# Patient Record
Sex: FEMALE | Race: WHITE | NOT HISPANIC OR LATINO | Employment: FULL TIME | URBAN - NONMETROPOLITAN AREA
[De-identification: names, ages, dates, MRNs, and addresses within clinical notes are randomized per-mention and may not be internally consistent; named-entity substitution may affect disease eponyms.]

---

## 2020-08-06 DIAGNOSIS — N95.0 ABNORMAL VAGINAL BLEEDING IN POSTMENOPAUSAL PATIENT: ICD-10-CM

## 2020-08-06 DIAGNOSIS — Z03.818 ENCOUNTER FOR OBSERVATION FOR SUSPECTED EXPOSURE TO OTHER BIOLOGICAL AGENTS RULED OUT: ICD-10-CM

## 2020-08-06 DIAGNOSIS — N95.0 POSTMENOPAUSAL BLEEDING: ICD-10-CM

## 2020-08-06 DIAGNOSIS — Z01.818 PREOP TESTING: Primary | ICD-10-CM

## 2020-08-06 RX ORDER — SODIUM CHLORIDE, SODIUM LACTATE, POTASSIUM CHLORIDE, CALCIUM CHLORIDE 600; 310; 30; 20 MG/100ML; MG/100ML; MG/100ML; MG/100ML
INJECTION, SOLUTION INTRAVENOUS CONTINUOUS
Status: CANCELLED | OUTPATIENT
Start: 2020-08-06

## 2020-08-06 RX ORDER — DOXYCYCLINE HYCLATE 100 MG
100 TABLET ORAL 2 TIMES DAILY
Status: CANCELLED | OUTPATIENT
Start: 2020-08-06 | End: 2020-08-07

## 2020-08-07 ENCOUNTER — ANESTHESIA EVENT (OUTPATIENT)
Dept: SURGERY | Facility: HOSPITAL | Age: 50
End: 2020-08-07
Payer: COMMERCIAL

## 2020-08-07 ENCOUNTER — HOSPITAL ENCOUNTER (OUTPATIENT)
Dept: PREADMISSION TESTING | Facility: HOSPITAL | Age: 50
Discharge: HOME | End: 2020-08-07
Attending: OBSTETRICS & GYNECOLOGY
Payer: COMMERCIAL

## 2020-08-07 VITALS — BODY MASS INDEX: 21.81 KG/M2 | WEIGHT: 161 LBS | HEIGHT: 72 IN

## 2020-08-07 DIAGNOSIS — Z41.9 SURGERY, ELECTIVE: Primary | ICD-10-CM

## 2020-08-07 DIAGNOSIS — N95.0 POSTMENOPAUSAL BLEEDING: ICD-10-CM

## 2020-08-07 DIAGNOSIS — Z01.818 PREOP TESTING: ICD-10-CM

## 2020-08-07 DIAGNOSIS — Z03.818 ENCOUNTER FOR OBSERVATION FOR SUSPECTED EXPOSURE TO OTHER BIOLOGICAL AGENTS RULED OUT: ICD-10-CM

## 2020-08-07 LAB
ABO + RH BLD: NORMAL
ANION GAP SERPL CALC-SCNC: 3 MMOL/L (ref 8–16)
BASOPHILS # BLD AUTO: 0.04 K/UL (ref 0–0.2)
BASOPHILS NFR BLD: 0.7 % (ref 0–1.9)
BLD GP AB SCN CELLS X3 SERPL QL: NORMAL
BUN SERPL-MCNC: 12 MG/DL (ref 6–20)
CALCIUM SERPL-MCNC: 9.7 MG/DL (ref 8.7–10.5)
CHLORIDE SERPL-SCNC: 105 MMOL/L (ref 95–110)
CO2 SERPL-SCNC: 31 MMOL/L (ref 23–29)
CREAT SERPL-MCNC: 0.9 MG/DL (ref 0.5–1.4)
DIFFERENTIAL METHOD: ABNORMAL
EOSINOPHIL # BLD AUTO: 0.1 K/UL (ref 0–0.5)
EOSINOPHIL NFR BLD: 1.1 % (ref 0–8)
ERYTHROCYTE [DISTWIDTH] IN BLOOD BY AUTOMATED COUNT: 11.5 % (ref 11.5–14.5)
EST. GFR  (AFRICAN AMERICAN): >60 ML/MIN/1.73 M^2
EST. GFR  (NON AFRICAN AMERICAN): >60 ML/MIN/1.73 M^2
GLUCOSE SERPL-MCNC: 90 MG/DL (ref 70–110)
HCT VFR BLD AUTO: 46.2 % (ref 37–48.5)
HGB BLD-MCNC: 16.2 G/DL (ref 12–16)
IMM GRANULOCYTES # BLD AUTO: 0.02 K/UL (ref 0–0.04)
IMM GRANULOCYTES NFR BLD AUTO: 0.3 % (ref 0–0.5)
LYMPHOCYTES # BLD AUTO: 1.3 K/UL (ref 1–4.8)
LYMPHOCYTES NFR BLD: 21.6 % (ref 18–48)
MCH RBC QN AUTO: 32 PG (ref 27–31)
MCHC RBC AUTO-ENTMCNC: 35.1 G/DL (ref 32–36)
MCV RBC AUTO: 91 FL (ref 82–98)
MONOCYTES # BLD AUTO: 0.4 K/UL (ref 0.3–1)
MONOCYTES NFR BLD: 7.2 % (ref 4–15)
NEUTROPHILS # BLD AUTO: 4.2 K/UL (ref 1.8–7.7)
NEUTROPHILS NFR BLD: 69.1 % (ref 38–73)
NRBC BLD-RTO: 0 /100 WBC
PLATELET # BLD AUTO: 301 K/UL (ref 150–350)
PMV BLD AUTO: 10.4 FL (ref 9.2–12.9)
POTASSIUM SERPL-SCNC: 4.5 MMOL/L (ref 3.5–5.1)
RBC # BLD AUTO: 5.07 M/UL (ref 4–5.4)
SODIUM SERPL-SCNC: 139 MMOL/L (ref 136–145)
WBC # BLD AUTO: 6.12 K/UL (ref 3.9–12.7)

## 2020-08-07 PROCEDURE — 80048 BASIC METABOLIC PNL TOTAL CA: CPT

## 2020-08-07 PROCEDURE — U0003 INFECTIOUS AGENT DETECTION BY NUCLEIC ACID (DNA OR RNA); SEVERE ACUTE RESPIRATORY SYNDROME CORONAVIRUS 2 (SARS-COV-2) (CORONAVIRUS DISEASE [COVID-19]), AMPLIFIED PROBE TECHNIQUE, MAKING USE OF HIGH THROUGHPUT TECHNOLOGIES AS DESCRIBED BY CMS-2020-01-R: HCPCS

## 2020-08-07 PROCEDURE — 85025 COMPLETE CBC W/AUTO DIFF WBC: CPT

## 2020-08-07 PROCEDURE — 93005 ELECTROCARDIOGRAM TRACING: CPT

## 2020-08-07 PROCEDURE — 93010 EKG 12-LEAD: ICD-10-PCS | Mod: ,,, | Performed by: INTERNAL MEDICINE

## 2020-08-07 PROCEDURE — 86850 RBC ANTIBODY SCREEN: CPT

## 2020-08-07 PROCEDURE — 36415 COLL VENOUS BLD VENIPUNCTURE: CPT

## 2020-08-07 PROCEDURE — 93010 ELECTROCARDIOGRAM REPORT: CPT | Mod: ,,, | Performed by: INTERNAL MEDICINE

## 2020-08-07 RX ORDER — MULTIVITAMIN
1 TABLET ORAL DAILY
COMMUNITY

## 2020-08-07 RX ORDER — LEVOTHYROXINE SODIUM 25 UG/1
15 TABLET ORAL
COMMUNITY
End: 2020-11-17 | Stop reason: SDUPTHER

## 2020-08-07 RX ORDER — PROGESTERONE 200 MG/1
200 CAPSULE ORAL DAILY
COMMUNITY
End: 2021-02-24 | Stop reason: SDUPTHER

## 2020-08-07 RX ORDER — VENLAFAXINE 100 MG/1
75 TABLET ORAL DAILY
COMMUNITY
End: 2020-11-17 | Stop reason: SDUPTHER

## 2020-08-07 RX ORDER — DOCOSAHEXAENOIC ACID/EPA 120-180 MG
500 CAPSULE ORAL
COMMUNITY

## 2020-08-07 RX ORDER — HYDROGEN PEROXIDE 3 %
20 SOLUTION, NON-ORAL MISCELLANEOUS
COMMUNITY

## 2020-08-07 RX ORDER — SPIRONOLACTONE 25 MG/1
25 TABLET ORAL DAILY
COMMUNITY

## 2020-08-07 RX ORDER — MINOCYCLINE HYDROCHLORIDE 100 MG/1
100 CAPSULE ORAL
COMMUNITY
End: 2022-06-09

## 2020-08-07 NOTE — ANESTHESIA PREPROCEDURE EVALUATION
08/07/2020  Liberty Reed is a 50 y.o., female.    Anesthesia Evaluation    I have reviewed the Patient Summary Reports.   I have reviewed the NPO Status.   I have reviewed the Medications.     Review of Systems  Anesthesia Hx:  No problems with previous Anesthesia  Denies Family Hx of Anesthesia complications.   Denies Personal Hx of Anesthesia complications.   Social:  Non-Smoker    Cardiovascular:  Cardiovascular Normal  ECG has been reviewed.    Pulmonary:  Pulmonary Normal    Renal/:  Renal/ Normal     Hepatic/GI:   GERD    Neurological:  Neurology Normal    Endocrine:   Hypothyroidism        Physical Exam  General:  Well nourished    Airway/Jaw/Neck:  Airway Findings: Mouth Opening: Normal Tongue: Normal  General Airway Assessment: Adult  Mallampati: II  TM Distance: Normal, at least 6 cm  Jaw/Neck Findings:  Neck ROM: Normal ROM      Dental:  Dental Findings: In tact   Chest/Lungs:  Chest/Lungs Findings: Clear to auscultation, Normal Respiratory Rate     Heart/Vascular:  Heart Findings: Rate: Normal  Rhythm: Regular Rhythm  Sounds: Normal        Mental Status:  Mental Status Findings:  Cooperative        Lab Results   Component Value Date    WBC 6.12 08/07/2020    HGB 16.2 (H) 08/07/2020    HCT 46.2 08/07/2020    MCV 91 08/07/2020     08/07/2020     CMP  Sodium   Date Value Ref Range Status   08/07/2020 139 136 - 145 mmol/L Final     Potassium   Date Value Ref Range Status   08/07/2020 4.5 3.5 - 5.1 mmol/L Final     Chloride   Date Value Ref Range Status   08/07/2020 105 95 - 110 mmol/L Final     CO2   Date Value Ref Range Status   08/07/2020 31 (H) 23 - 29 mmol/L Final     Glucose   Date Value Ref Range Status   08/07/2020 90 70 - 110 mg/dL Final     BUN, Bld   Date Value Ref Range Status   08/07/2020 12 6 - 20 mg/dL Final     Creatinine   Date Value Ref Range Status   08/07/2020 0.9 0.5 -  1.4 mg/dL Final     Calcium   Date Value Ref Range Status   08/07/2020 9.7 8.7 - 10.5 mg/dL Final     Anion Gap   Date Value Ref Range Status   08/07/2020 3 (L) 8 - 16 mmol/L Final     eGFR if    Date Value Ref Range Status   08/07/2020 >60.0 >60 mL/min/1.73 m^2 Final     eGFR if non    Date Value Ref Range Status   08/07/2020 >60.0 >60 mL/min/1.73 m^2 Final     Comment:     Calculation used to obtain the estimated glomerular filtration  rate (eGFR) is the CKD-EPI equation.      covid neg    Anesthesia Plan  Type of Anesthesia, risks & benefits discussed:  Anesthesia Type:  general, MAC  Patient's Preference:   Intra-op Monitoring Plan: standard ASA monitors  Intra-op Monitoring Plan Comments:   Post Op Pain Control Plan: multimodal analgesia  Post Op Pain Control Plan Comments:   Induction:    Beta Blocker:  Patient is not currently on a Beta-Blocker (No further documentation required).       Informed Consent: Patient understands risks and agrees with Anesthesia plan.  Questions answered. Anesthesia consent signed with patient.  ASA Score: 2     Day of Surgery Review of History & Physical: I have interviewed and examined the patient. I have reviewed the patient's H&P dated:  There are no significant changes.  H&P update referred to the surgeon.         Ready For Surgery From Anesthesia Perspective.

## 2020-08-07 NOTE — DISCHARGE INSTRUCTIONS
BEFORE THE PROCEDURE:    REPORT ANY CHANGE IN YOUR PHYSICAL CONDITION TO YOUR DOCTOR IMMEDIATELY.  SELF ISOLATE AND CHECK TEMPERATURE DAILY, IF TEMP OVER 100, CALL PHYSICIAN IMMEDIATELY.   NO SMOKING OR ALCOHOL FOR 72 HOURS BEFORE YOUR PROCEDURE.   DO NOT EAT OR DRINK ANYTHING AFTER MIDNIGHT THE NIGHT BEFORE YOUR PROCEDURE    THE MORNING OF YOUR PROCEDURE:    PLEASE ARRIVE AT THE FRONT LOBBY AT 6 a.m. AND REPORT TO FIRST FLOOR REGISTRATION.   YOU MAY GET A PHONE CALL THE DAY BEFORE YOUR PROCEDURE IF THE APPOINTED TIME CHANGES.  NO MAKE UP OR NAIL POLISH.   ALL MINORS MUST BE ACCOMPANIED BY AN ADULT AT ALL TIMES.     TAKE A SHOWER/BATH THE NIGHT BEFORE YOUR PROCEDURE .     DAY OF YOUR PROCEDURE:    TAKE BLOOD PRESSURE MEDICATIONS THE MORNING OF YOUR PROCEDURE, WITH SMALL SIPS WATER, AS DIRECTED BY YOUR PHYSICIAN.   DO NOT TAKE ANY DIABETIC MEDICATIONS UNLESS DIRECTED TO DO SO BY YOUR PHYSICIAN.   CONTACT LENSES AND DENTURES MUST BE REMOVED.  A RESPONSIBLE ADULT MUST ACCOMPANY YOU HOME UPON DISCHARGE.   ONLY 1 VISITORS ALLOWED PER ROOM.

## 2020-08-08 LAB — SARS-COV-2 RNA RESP QL NAA+PROBE: NOT DETECTED

## 2020-08-10 ENCOUNTER — HOSPITAL ENCOUNTER (OUTPATIENT)
Facility: HOSPITAL | Age: 50
Discharge: HOME | End: 2020-08-10
Attending: OBSTETRICS & GYNECOLOGY | Admitting: OBSTETRICS & GYNECOLOGY
Payer: COMMERCIAL

## 2020-08-10 ENCOUNTER — ANESTHESIA (OUTPATIENT)
Dept: SURGERY | Facility: HOSPITAL | Age: 50
End: 2020-08-10
Payer: COMMERCIAL

## 2020-08-10 VITALS
TEMPERATURE: 98 F | HEART RATE: 78 BPM | RESPIRATION RATE: 18 BRPM | SYSTOLIC BLOOD PRESSURE: 103 MMHG | HEIGHT: 68 IN | OXYGEN SATURATION: 97 % | DIASTOLIC BLOOD PRESSURE: 59 MMHG | BODY MASS INDEX: 24.48 KG/M2

## 2020-08-10 DIAGNOSIS — N95.0 POSTMENOPAUSAL BLEEDING: ICD-10-CM

## 2020-08-10 DIAGNOSIS — N95.0 ABNORMAL VAGINAL BLEEDING IN POSTMENOPAUSAL PATIENT: Primary | ICD-10-CM

## 2020-08-10 LAB — B-HCG UR QL: NEGATIVE

## 2020-08-10 PROCEDURE — 81025 URINE PREGNANCY TEST: CPT

## 2020-08-10 PROCEDURE — 37000009 HC ANESTHESIA EA ADD 15 MINS: Performed by: OBSTETRICS & GYNECOLOGY

## 2020-08-10 PROCEDURE — 63600175 PHARM REV CODE 636 W HCPCS: Performed by: NURSE ANESTHETIST, CERTIFIED REGISTERED

## 2020-08-10 PROCEDURE — 25000003 PHARM REV CODE 250: Performed by: OBSTETRICS & GYNECOLOGY

## 2020-08-10 PROCEDURE — 63600175 PHARM REV CODE 636 W HCPCS: Performed by: OBSTETRICS & GYNECOLOGY

## 2020-08-10 PROCEDURE — 71000015 HC POSTOP RECOV 1ST HR: Performed by: OBSTETRICS & GYNECOLOGY

## 2020-08-10 PROCEDURE — 71000033 HC RECOVERY, INTIAL HOUR: Performed by: OBSTETRICS & GYNECOLOGY

## 2020-08-10 PROCEDURE — 71000016 HC POSTOP RECOV ADDL HR: Performed by: OBSTETRICS & GYNECOLOGY

## 2020-08-10 PROCEDURE — 36000707: Performed by: OBSTETRICS & GYNECOLOGY

## 2020-08-10 PROCEDURE — 36000706: Performed by: OBSTETRICS & GYNECOLOGY

## 2020-08-10 PROCEDURE — 27201423 OPTIME MED/SURG SUP & DEVICES STERILE SUPPLY: Performed by: OBSTETRICS & GYNECOLOGY

## 2020-08-10 PROCEDURE — 37000008 HC ANESTHESIA 1ST 15 MINUTES: Performed by: OBSTETRICS & GYNECOLOGY

## 2020-08-10 RX ORDER — IBUPROFEN 600 MG/1
600 TABLET ORAL 3 TIMES DAILY
Qty: 30 TABLET | Refills: 1 | Status: SHIPPED | OUTPATIENT
Start: 2020-08-10 | End: 2021-07-22

## 2020-08-10 RX ORDER — SODIUM CHLORIDE, SODIUM LACTATE, POTASSIUM CHLORIDE, CALCIUM CHLORIDE 600; 310; 30; 20 MG/100ML; MG/100ML; MG/100ML; MG/100ML
INJECTION, SOLUTION INTRAVENOUS CONTINUOUS
Status: DISCONTINUED | OUTPATIENT
Start: 2020-08-10 | End: 2020-08-10 | Stop reason: HOSPADM

## 2020-08-10 RX ORDER — HYDROCODONE BITARTRATE AND ACETAMINOPHEN 5; 325 MG/1; MG/1
1 TABLET ORAL EVERY 4 HOURS PRN
Status: DISCONTINUED | OUTPATIENT
Start: 2020-08-10 | End: 2020-08-10 | Stop reason: HOSPADM

## 2020-08-10 RX ORDER — SODIUM CHLORIDE, SODIUM LACTATE, POTASSIUM CHLORIDE, CALCIUM CHLORIDE 600; 310; 30; 20 MG/100ML; MG/100ML; MG/100ML; MG/100ML
INJECTION, SOLUTION INTRAVENOUS CONTINUOUS PRN
Status: DISCONTINUED | OUTPATIENT
Start: 2020-08-10 | End: 2020-08-10

## 2020-08-10 RX ORDER — MORPHINE SULFATE 10 MG/ML
INJECTION INTRAMUSCULAR; INTRAVENOUS; SUBCUTANEOUS
Status: DISCONTINUED | OUTPATIENT
Start: 2020-08-10 | End: 2020-08-10

## 2020-08-10 RX ORDER — DOXYCYCLINE 100 MG/10ML
100 INJECTION, POWDER, LYOPHILIZED, FOR SOLUTION INTRAVENOUS ONCE
Status: DISCONTINUED | OUTPATIENT
Start: 2020-08-10 | End: 2020-08-10

## 2020-08-10 RX ORDER — LEVOTHYROXINE SODIUM 25 UG/1
15 TABLET ORAL
Status: CANCELLED | OUTPATIENT
Start: 2020-08-11

## 2020-08-10 RX ORDER — HYDROCODONE BITARTRATE AND ACETAMINOPHEN 5; 325 MG/1; MG/1
1 TABLET ORAL EVERY 6 HOURS PRN
Qty: 15 TABLET | Refills: 0 | Status: SHIPPED | OUTPATIENT
Start: 2020-08-10 | End: 2021-07-22

## 2020-08-10 RX ORDER — FENTANYL CITRATE 50 UG/ML
INJECTION, SOLUTION INTRAMUSCULAR; INTRAVENOUS
Status: DISCONTINUED | OUTPATIENT
Start: 2020-08-10 | End: 2020-08-10

## 2020-08-10 RX ORDER — DIPHENHYDRAMINE HYDROCHLORIDE 50 MG/ML
25 INJECTION INTRAMUSCULAR; INTRAVENOUS EVERY 4 HOURS PRN
Status: DISCONTINUED | OUTPATIENT
Start: 2020-08-10 | End: 2020-08-10 | Stop reason: HOSPADM

## 2020-08-10 RX ORDER — VENLAFAXINE 75 MG/1
75 TABLET ORAL DAILY
Status: CANCELLED | OUTPATIENT
Start: 2020-08-10

## 2020-08-10 RX ORDER — MIDAZOLAM HYDROCHLORIDE 1 MG/ML
INJECTION INTRAMUSCULAR; INTRAVENOUS
Status: DISCONTINUED | OUTPATIENT
Start: 2020-08-10 | End: 2020-08-10

## 2020-08-10 RX ORDER — DIPHENHYDRAMINE HYDROCHLORIDE 50 MG/ML
25 INJECTION INTRAMUSCULAR; INTRAVENOUS EVERY 6 HOURS PRN
Status: DISCONTINUED | OUTPATIENT
Start: 2020-08-10 | End: 2020-08-10

## 2020-08-10 RX ORDER — HYDROMORPHONE HYDROCHLORIDE 1 MG/ML
0.5 INJECTION, SOLUTION INTRAMUSCULAR; INTRAVENOUS; SUBCUTANEOUS EVERY 5 MIN PRN
Status: DISCONTINUED | OUTPATIENT
Start: 2020-08-10 | End: 2020-08-10

## 2020-08-10 RX ORDER — PROGESTERONE 200 MG/1
200 CAPSULE ORAL DAILY
Status: CANCELLED | OUTPATIENT
Start: 2020-08-10

## 2020-08-10 RX ORDER — MORPHINE SULFATE 4 MG/ML
4 INJECTION, SOLUTION INTRAMUSCULAR; INTRAVENOUS EVERY 5 MIN PRN
Status: DISCONTINUED | OUTPATIENT
Start: 2020-08-10 | End: 2020-08-10

## 2020-08-10 RX ORDER — PROPOFOL 10 MG/ML
VIAL (ML) INTRAVENOUS
Status: DISCONTINUED | OUTPATIENT
Start: 2020-08-10 | End: 2020-08-10

## 2020-08-10 RX ORDER — SPIRONOLACTONE 25 MG/1
25 TABLET ORAL DAILY
Status: CANCELLED | OUTPATIENT
Start: 2020-08-10

## 2020-08-10 RX ORDER — ONDANSETRON 2 MG/ML
INJECTION INTRAMUSCULAR; INTRAVENOUS
Status: DISCONTINUED | OUTPATIENT
Start: 2020-08-10 | End: 2020-08-10

## 2020-08-10 RX ORDER — DOXYCYCLINE HYCLATE 100 MG
100 TABLET ORAL 2 TIMES DAILY
Status: DISCONTINUED | OUTPATIENT
Start: 2020-08-10 | End: 2020-08-10

## 2020-08-10 RX ORDER — DEXAMETHASONE SODIUM PHOSPHATE 4 MG/ML
INJECTION, SOLUTION INTRA-ARTICULAR; INTRALESIONAL; INTRAMUSCULAR; INTRAVENOUS; SOFT TISSUE
Status: DISCONTINUED | OUTPATIENT
Start: 2020-08-10 | End: 2020-08-10

## 2020-08-10 RX ORDER — ONDANSETRON 2 MG/ML
4 INJECTION INTRAMUSCULAR; INTRAVENOUS DAILY PRN
Status: DISCONTINUED | OUTPATIENT
Start: 2020-08-10 | End: 2020-08-10

## 2020-08-10 RX ORDER — DIPHENHYDRAMINE HCL 25 MG
25 CAPSULE ORAL EVERY 4 HOURS PRN
Status: DISCONTINUED | OUTPATIENT
Start: 2020-08-10 | End: 2020-08-10 | Stop reason: HOSPADM

## 2020-08-10 RX ORDER — ONDANSETRON 4 MG/1
8 TABLET, ORALLY DISINTEGRATING ORAL EVERY 8 HOURS PRN
Status: DISCONTINUED | OUTPATIENT
Start: 2020-08-10 | End: 2020-08-10 | Stop reason: HOSPADM

## 2020-08-10 RX ORDER — KETOROLAC TROMETHAMINE 30 MG/ML
INJECTION, SOLUTION INTRAMUSCULAR; INTRAVENOUS
Status: DISCONTINUED | OUTPATIENT
Start: 2020-08-10 | End: 2020-08-10

## 2020-08-10 RX ORDER — LIDOCAINE HYDROCHLORIDE 10 MG/ML
INJECTION, SOLUTION INTRAVENOUS
Status: DISCONTINUED | OUTPATIENT
Start: 2020-08-10 | End: 2020-08-10

## 2020-08-10 RX ADMIN — SODIUM CHLORIDE, SODIUM LACTATE, POTASSIUM CHLORIDE, AND CALCIUM CHLORIDE: 600; 310; 30; 20 INJECTION, SOLUTION INTRAVENOUS at 08:08

## 2020-08-10 RX ADMIN — DEXAMETHASONE SODIUM PHOSPHATE 4 MG: 4 INJECTION, SOLUTION INTRA-ARTICULAR; INTRALESIONAL; INTRAMUSCULAR; INTRAVENOUS; SOFT TISSUE at 08:08

## 2020-08-10 RX ADMIN — MIDAZOLAM HYDROCHLORIDE 2 MG: 1 INJECTION INTRAMUSCULAR; INTRAVENOUS at 08:08

## 2020-08-10 RX ADMIN — SODIUM CHLORIDE, POTASSIUM CHLORIDE, SODIUM LACTATE AND CALCIUM CHLORIDE: 600; 310; 30; 20 INJECTION, SOLUTION INTRAVENOUS at 09:08

## 2020-08-10 RX ADMIN — KETOROLAC TROMETHAMINE 30 MG: 30 INJECTION, SOLUTION INTRAMUSCULAR at 08:08

## 2020-08-10 RX ADMIN — LIDOCAINE HYDROCHLORIDE 50 MG: 10 INJECTION, SOLUTION INTRAVENOUS at 08:08

## 2020-08-10 RX ADMIN — FENTANYL CITRATE 100 MCG: 50 INJECTION, SOLUTION INTRAMUSCULAR; INTRAVENOUS at 08:08

## 2020-08-10 RX ADMIN — Medication 200 MG: at 08:08

## 2020-08-10 RX ADMIN — SODIUM CHLORIDE, POTASSIUM CHLORIDE, SODIUM LACTATE AND CALCIUM CHLORIDE: 600; 310; 30; 20 INJECTION, SOLUTION INTRAVENOUS at 06:08

## 2020-08-10 RX ADMIN — ONDANSETRON 4 MG: 2 INJECTION INTRAMUSCULAR; INTRAVENOUS at 08:08

## 2020-08-10 RX ADMIN — MORPHINE SULFATE 10 MG: 10 INJECTION INTRAVENOUS at 09:08

## 2020-08-10 RX ADMIN — DOXYCYCLINE 100 MG: 100 INJECTION, POWDER, LYOPHILIZED, FOR SOLUTION INTRAVENOUS at 07:08

## 2020-08-10 NOTE — ANESTHESIA POSTPROCEDURE EVALUATION
Anesthesia Post Evaluation    Patient: Liberty Reed    Procedure(s) Performed: Procedure(s) (LRB):  HYSTEROSCOPY, THERAPEUTIC (N/A)  DILATION AND CURETTAGE, UTERUS (N/A)  ABLATION, ENDOMETRIUM (N/A)    Final Anesthesia Type: general    Patient location during evaluation: PACU  Patient participation: Yes- Able to Participate  Level of consciousness: awake  Post-procedure vital signs: reviewed and stable  Pain management: adequate  Airway patency: patent    PONV status at discharge: No PONV  Anesthetic complications: no      Cardiovascular status: blood pressure returned to baseline  Respiratory status: spontaneous ventilation  Hydration status: euvolemic  Follow-up not needed.          Vitals Value Taken Time   /75 08/10/20 0911   Temp 36.5 °C (97.7 °F) 08/10/20 0910   Pulse 81 08/10/20 0911   Resp 11 08/10/20 0911   SpO2 100 % 08/10/20 0911   Vitals shown include unvalidated device data.      No case tracking events are documented in the log.      Pain/Dkaota Score: No data recorded

## 2020-08-10 NOTE — OP NOTE
OPERATIVE REPORT    DATE: (date: 08/10/2020)    PREOPERATIVE DIAGNOSIS  1.  Postmenopausal bleeding    POSTOPERATIVE DIAGNOSIS  1.  Postmenopausal bleeding  2.  Uterine polyp    PROCEDURE:  1.  Hysteroscopy  2.  Dilation curettage with polypectomy  3.  NovaSure endometrial ablation    SURGEON: Dr. Magaly Pedersen    ASSISTANT:  Daphnie Woodard scrub tech    ANESTHESIA: General    COMPLICATIONS: None    EBL:  25 cc    IV FLUIDS:  600  cc    URINE OUTPUT:  100  cc    FINDINGS:  Uterus sounded to 9 cm, large polyp seen in the uterine cavity. Uterine width 4 cm, uterine length 5 cm, power 110 w, total treatment time 1 min 4 sec.  Distension medium 200 cc in and 200 cc out    PROCEDURE: Patient was taking to the operating room where general anesthesia was administered and found to be adequate.  She was prepped and draped in the dorsal lithotomy position.  A weighted sterile speculum was placed in the vagina.  The anterior lip of the cervix was grasped with a single tooth tenaculum.  The uterus was sounded to approximately 9 cm.  The hysteroscope was advanced through the cervical os.  The endometrium was inspected and found to have a large uterine polyp.  Bilateral tubal ostia were seen.  The hysteroscope was removed and a sharp curettage was performed until a gritty texture noted and the polyp was removed. The NovaSure ablation was assembled and inserted into the uterine cavity the array was deployed and settled into the uterine cavity. The dimensions were entered into the computer, and the treatment was performed.  Total treatment time 1 min 4 sec.  The array was retracted and the device removed from the uterine cavity.      All instruments were removed.  Silver nitrate was applied to the cervix where the single tooth tenaculum had been placed.  Excellent hemostasis was noted.    Sponge, lap, needle counts were correct x 2.

## 2020-08-10 NOTE — INTERVAL H&P NOTE
The patient has been examined and the H&P has been reviewed:    I concur with the findings and no changes have occurred since H&P was written.    Surgery risks, benefits and alternative options discussed and understood by patient/family.          Active Hospital Problems    Diagnosis  POA    Abnormal vaginal bleeding in postmenopausal patient [N95.0]  Yes      Resolved Hospital Problems   No resolved problems to display.

## 2020-08-10 NOTE — TRANSFER OF CARE
"Anesthesia Transfer of Care Note    Patient: Liberty Reed    Procedure(s) Performed: Procedure(s) (LRB):  HYSTEROSCOPY, THERAPEUTIC (N/A)  DILATION AND CURETTAGE, UTERUS (N/A)  ABLATION, ENDOMETRIUM (N/A)    Patient location: PACU    Anesthesia Type: general    Transport from OR: Transported from OR on room air with adequate spontaneous ventilation    Post pain: adequate analgesia    Post assessment: no apparent anesthetic complications    Post vital signs: stable    Level of consciousness: awake    Nausea/Vomiting: no nausea/vomiting    Complications: none    Transfer of care protocol was followed      Last vitals:   Visit Vitals  /61   Pulse 71   Temp 36.7 °C (98 °F)   Resp 18   Ht 5' 8" (1.727 m)   LMP 07/04/2020 (Approximate)   SpO2 99%   Breastfeeding No   BMI 24.48 kg/m²     "

## 2020-08-24 ENCOUNTER — OFFICE VISIT (OUTPATIENT)
Dept: OBSTETRICS AND GYNECOLOGY | Facility: CLINIC | Age: 50
End: 2020-08-24
Payer: COMMERCIAL

## 2020-08-24 VITALS
SYSTOLIC BLOOD PRESSURE: 130 MMHG | DIASTOLIC BLOOD PRESSURE: 84 MMHG | HEART RATE: 83 BPM | BODY MASS INDEX: 24.63 KG/M2 | WEIGHT: 162 LBS

## 2020-08-24 DIAGNOSIS — N84.0 POLYP, UTERUS CORPUS: Primary | ICD-10-CM

## 2020-08-24 DIAGNOSIS — Z09 POSTOP CHECK: ICD-10-CM

## 2020-08-24 PROCEDURE — 99999 PR PBB SHADOW E&M-EST. PATIENT-LVL III: CPT | Mod: PBBFAC,,, | Performed by: OBSTETRICS & GYNECOLOGY

## 2020-08-24 PROCEDURE — 99024 POSTOP FOLLOW-UP VISIT: CPT | Mod: S$GLB,,, | Performed by: OBSTETRICS & GYNECOLOGY

## 2020-08-24 PROCEDURE — 99999 PR PBB SHADOW E&M-EST. PATIENT-LVL III: ICD-10-PCS | Mod: PBBFAC,,, | Performed by: OBSTETRICS & GYNECOLOGY

## 2020-08-24 PROCEDURE — 99024 PR POST-OP FOLLOW-UP VISIT: ICD-10-PCS | Mod: S$GLB,,, | Performed by: OBSTETRICS & GYNECOLOGY

## 2020-08-24 NOTE — PROGRESS NOTES
Subjective:    Patient ID: Liberty Reed is a 50 y.o.. Female.    Chief Complaint:   Chief Complaint   Patient presents with    Post-op Evaluation     2 week f/u d&c with ablation and polpectomy        History of Present Illness:   Liberty presents today two weeks  Post Op diagnostic hysteroscopy and endometrial ablation and is here for followup.  She denies vaginal bleeeding. She denies pelvic pain, or fever. She is voiding well and having normal bowel movements.    Pathology:  Reviewed with patient.  Portions of uterine polyp.  No evidence of hyperplasia/carcinoma    PE:  Abdomen:  soft, non-tender; bowel sounds normal         Recent lab:  Lab Results   Component Value Date    WBC 6.12 08/07/2020    HGB 16.2 (H) 08/07/2020    HCT 46.2 08/07/2020    MCV 91 08/07/2020     08/07/2020           Impresssion:  Post Op diagnostic hysteroscopy and endometrial ablation with D&C  Encounter Diagnoses   Name Primary?    Polyp, uterus corpus Yes    Postop check          Plan:  Polyp, uterus corpus    Postop check        Reviewed postoperative precautions and instructions.    She verberlizes understanding.  Return in for annual      Magaly Pedersen MD FACOG    08/24/2020  10:01 AM

## 2020-09-09 ENCOUNTER — OFFICE VISIT (OUTPATIENT)
Dept: OBSTETRICS AND GYNECOLOGY | Facility: CLINIC | Age: 50
End: 2020-09-09
Payer: COMMERCIAL

## 2020-09-09 VITALS
WEIGHT: 163 LBS | BODY MASS INDEX: 24.78 KG/M2 | SYSTOLIC BLOOD PRESSURE: 131 MMHG | HEART RATE: 85 BPM | DIASTOLIC BLOOD PRESSURE: 98 MMHG

## 2020-09-09 DIAGNOSIS — Z12.4 SCREENING FOR MALIGNANT NEOPLASM OF CERVIX: ICD-10-CM

## 2020-09-09 DIAGNOSIS — Z79.890 HORMONE REPLACEMENT THERAPY (HRT): ICD-10-CM

## 2020-09-09 DIAGNOSIS — Z01.419 ROUTINE GYNECOLOGICAL EXAMINATION: Primary | ICD-10-CM

## 2020-09-09 PROCEDURE — 99386 PREV VISIT NEW AGE 40-64: CPT | Mod: S$GLB,,, | Performed by: OBSTETRICS & GYNECOLOGY

## 2020-09-09 PROCEDURE — 99999 PR PBB SHADOW E&M-EST. PATIENT-LVL III: CPT | Mod: PBBFAC,,, | Performed by: OBSTETRICS & GYNECOLOGY

## 2020-09-09 PROCEDURE — 3008F PR BODY MASS INDEX (BMI) DOCUMENTED: ICD-10-PCS | Mod: CPTII,S$GLB,, | Performed by: OBSTETRICS & GYNECOLOGY

## 2020-09-09 PROCEDURE — 3008F BODY MASS INDEX DOCD: CPT | Mod: CPTII,S$GLB,, | Performed by: OBSTETRICS & GYNECOLOGY

## 2020-09-09 PROCEDURE — 99386 PR PREVENTIVE VISIT,NEW,40-64: ICD-10-PCS | Mod: S$GLB,,, | Performed by: OBSTETRICS & GYNECOLOGY

## 2020-09-09 PROCEDURE — 99999 PR PBB SHADOW E&M-EST. PATIENT-LVL III: ICD-10-PCS | Mod: PBBFAC,,, | Performed by: OBSTETRICS & GYNECOLOGY

## 2020-09-09 RX ORDER — ESTERIFIED ESTROGEN AND METHYLTESTOSTERONE .625; 1.25 MG/1; MG/1
1 TABLET ORAL DAILY
Qty: 30 TABLET | Refills: 4 | Status: SHIPPED | OUTPATIENT
Start: 2020-09-09 | End: 2022-09-13

## 2020-09-09 NOTE — PROGRESS NOTES
SUBJECTIVE:   50 y.o. female for annual routine Pap and checkup.  Current Outpatient Medications   Medication Sig Dispense Refill    docosahexaenoic acid-epa (FISH OIL) 120-180 mg Cap Take 500 mg by mouth.      esomeprazole (NEXIUM) 20 MG capsule Take 20 mg by mouth before breakfast.      HYDROcodone-acetaminophen (NORCO) 5-325 mg per tablet Take 1 tablet by mouth every 6 (six) hours as needed for Pain. (Patient not taking: Reported on 8/24/2020) 15 tablet 0    ibuprofen (ADVIL,MOTRIN) 600 MG tablet Take 1 tablet (600 mg total) by mouth 3 (three) times daily. 30 tablet 1    levothyroxine (SYNTHROID) 25 MCG tablet Take 15 mcg by mouth before breakfast.      minocycline (MINOCIN,DYNACIN) 100 MG capsule Take 100 mg by mouth every 12 (twelve) hours.      multivitamin (THERAGRAN) per tablet Take 1 tablet by mouth once daily. Vitamin a-d-k      progesterone (PROMETRIUM) 200 MG capsule Take 200 mg by mouth once daily.      spironolactone (ALDACTONE) 25 MG tablet Take 25 mg by mouth once daily.      venlafaxine (EFFEXOR) 100 MG Tab Take 75 mg by mouth once daily.       No current facility-administered medications for this visit.      Allergies: Patient has no known allergies.   No LMP recorded. Patient is perimenopausal.  And has history of endometrial ablation.    ROS:  Feeling well. No dyspnea or chest pain on exertion.  No abdominal pain, change in bowel habits, black or bloody stools.  No urinary tract symptoms. GYN ROS: no breast pain or new or enlarging lumps on self exam, no vaginal bleeding, she complains of some hot flashes.  Patient is due for her hormone pellets.  During our transition to new supply are she would like to try a some oral medication while waiting. No neurological complaints.    OBJECTIVE:   The patient appears well, alert, oriented x 3, in no distress.  BP (!) 131/98 (BP Location: Left arm, Patient Position: Sitting, BP Method: Medium (Automatic))   Pulse 85   Wt 73.9 kg (163 lb)   BMI  24.78 kg/m²   ENT normal.  Neck supple. No adenopathy or thyromegaly. IRMA. Lungs are clear, good air entry, no wheezes, rhonchi or rales. S1 and S2 normal, no murmurs, regular rate and rhythm. Abdomen soft without tenderness, guarding, mass or organomegaly. Extremities show no edema, normal peripheral pulses. Neurological is normal, no focal findings.    BREAST EXAM: breasts appear normal, no suspicious masses, no skin or nipple changes or axillary nodes    PELVIC EXAM: VULVA: normal appearing vulva with no masses, tenderness or lesions, VAGINA: normal appearing vagina with normal color and discharge, no lesions, CERVIX: normal appearing cervix without discharge or lesions, UTERUS: uterus is normal size, shape, consistency and nontender, ADNEXA: normal adnexa in size, nontender and no masses    ASSESSMENT:   well woman  no contraindication to continue hormonal therapy    PLAN:   pap smear  return annually or prn  rx covaryx HS daily  We will call when ready for pellets    Magaly Pedersen MD FACOG

## 2020-09-16 LAB — LIQUID BASED PAP SMEAR, SCREENING: NORMAL

## 2020-11-17 ENCOUNTER — PROCEDURE VISIT (OUTPATIENT)
Dept: OBSTETRICS AND GYNECOLOGY | Facility: CLINIC | Age: 50
End: 2020-11-17
Payer: COMMERCIAL

## 2020-11-17 VITALS
DIASTOLIC BLOOD PRESSURE: 80 MMHG | SYSTOLIC BLOOD PRESSURE: 136 MMHG | WEIGHT: 159 LBS | BODY MASS INDEX: 24.1 KG/M2 | HEART RATE: 81 BPM | HEIGHT: 68 IN

## 2020-11-17 DIAGNOSIS — Z79.890 HORMONE REPLACEMENT THERAPY (HRT): Primary | ICD-10-CM

## 2020-11-17 PROCEDURE — 11980 PR IMPLANT,HORMONE,SUBCUTANEOUS: ICD-10-PCS | Mod: ,,, | Performed by: OBSTETRICS & GYNECOLOGY

## 2020-11-17 PROCEDURE — 11980 IMPLANT HORMONE PELLET(S): CPT | Mod: ,,, | Performed by: OBSTETRICS & GYNECOLOGY

## 2020-11-17 RX ORDER — VENLAFAXINE HYDROCHLORIDE 75 MG/1
CAPSULE, EXTENDED RELEASE ORAL
COMMUNITY
Start: 2020-11-06

## 2020-11-17 RX ORDER — LEVOTHYROXINE, LIOTHYRONINE 9.5; 2.25 UG/1; UG/1
TABLET ORAL
COMMUNITY
Start: 2020-10-23 | End: 2021-02-24 | Stop reason: SDUPTHER

## 2020-11-17 NOTE — PATIENT INSTRUCTIONS
No major gluteal exercises for 3 days (squats, cycling, elliptical) showers only for 3 days; no tub baths or swimming    Stop  Estratest after 3 days.

## 2020-11-17 NOTE — PROCEDURES
Procedures   SUBJECTIVE:   Liberty Reed is a 50 y.o. female who presents for hormone pellet insertion. We have already discussed this procedure, including option of not performing procedure, technique of procedure and potential for scarring at a recent visit.  Consents obtained    OBJECTIVE:   Patient appears well. Vitals are normal.  Skin:  Right hip within normal limits.    ASSESSMENT:   Hormone replacement therapy    PLAN:   After informed consent was obtained, using chlorhexidine for cleansing and 1% Lidocaine without epinephrine for anesthetic, with sterile technique, a 3 mm skin incision was made with the 11 blade.  Hormone pellets were inserted using the trocar  was performed.  The incision was closed with benzoin, Steri-Strips, and pressure dressing. wound care instructions provided.  Be alert for any signs of cutaneous infection. The procedure was well tolerated without complications. Follow up:  6 weeks for labs and the 3 months for next injection.    PELLETS:   Estradiol 15 mg, LOT# [id number], Exp 11/27/2020  Testosterone 50 mg, LOT# [id number], Exp 11/19/2020  Testosterone 87.5 mg, LOT#[id number], Exp 11/17/2020

## 2020-12-29 ENCOUNTER — CLINICAL SUPPORT (OUTPATIENT)
Dept: OBSTETRICS AND GYNECOLOGY | Facility: CLINIC | Age: 50
End: 2020-12-29
Payer: COMMERCIAL

## 2020-12-29 DIAGNOSIS — R53.83 FATIGUE, UNSPECIFIED TYPE: ICD-10-CM

## 2020-12-29 DIAGNOSIS — N95.1 MENOPAUSAL AND FEMALE CLIMACTERIC STATES: ICD-10-CM

## 2020-12-29 DIAGNOSIS — Z79.890 HORMONE REPLACEMENT THERAPY (HRT): Primary | ICD-10-CM

## 2020-12-29 LAB
ESTRADIOL SERPL-MCNC: 72 PG/ML
FSH SERPL-ACNC: 9 MIU/ML
TESTOST SERPL-MCNC: 242 NG/DL (ref 5–73)

## 2020-12-29 PROCEDURE — 82670 ASSAY OF TOTAL ESTRADIOL: CPT

## 2020-12-29 PROCEDURE — 86376 MICROSOMAL ANTIBODY EACH: CPT

## 2020-12-29 PROCEDURE — 84403 ASSAY OF TOTAL TESTOSTERONE: CPT

## 2020-12-29 PROCEDURE — 83001 ASSAY OF GONADOTROPIN (FSH): CPT

## 2020-12-30 LAB — THYROPEROXIDASE IGG SERPL-ACNC: <6 IU/ML

## 2021-02-19 LAB — HPV16+18+H RISK 12 DNA CVX-IMP: NORMAL

## 2021-02-24 RX ORDER — LEVOTHYROXINE, LIOTHYRONINE 9.5; 2.25 UG/1; UG/1
15 TABLET ORAL DAILY
Qty: 30 TABLET | Refills: 3 | Status: SHIPPED | OUTPATIENT
Start: 2021-02-24 | End: 2021-06-22 | Stop reason: SDUPTHER

## 2021-02-24 RX ORDER — PROGESTERONE 200 MG/1
200 CAPSULE ORAL DAILY
Qty: 30 CAPSULE | Refills: 0 | Status: SHIPPED | OUTPATIENT
Start: 2021-02-24 | End: 2021-03-23 | Stop reason: SDUPTHER

## 2021-03-08 ENCOUNTER — TELEPHONE (OUTPATIENT)
Dept: OBSTETRICS AND GYNECOLOGY | Facility: CLINIC | Age: 51
End: 2021-03-08

## 2021-03-23 RX ORDER — PROGESTERONE 200 MG/1
200 CAPSULE ORAL DAILY
Qty: 30 CAPSULE | Refills: 0 | Status: SHIPPED | OUTPATIENT
Start: 2021-03-23 | End: 2021-04-26 | Stop reason: SDUPTHER

## 2021-03-31 ENCOUNTER — PROCEDURE VISIT (OUTPATIENT)
Dept: OBSTETRICS AND GYNECOLOGY | Facility: CLINIC | Age: 51
End: 2021-03-31
Payer: COMMERCIAL

## 2021-03-31 VITALS
WEIGHT: 157 LBS | SYSTOLIC BLOOD PRESSURE: 133 MMHG | BODY MASS INDEX: 23.79 KG/M2 | DIASTOLIC BLOOD PRESSURE: 77 MMHG | HEIGHT: 68 IN | HEART RATE: 87 BPM

## 2021-03-31 DIAGNOSIS — N95.1 MENOPAUSAL AND FEMALE CLIMACTERIC STATES: ICD-10-CM

## 2021-03-31 DIAGNOSIS — Z79.890 HORMONE REPLACEMENT THERAPY (HRT): Primary | ICD-10-CM

## 2021-03-31 DIAGNOSIS — R53.83 FATIGUE, UNSPECIFIED TYPE: ICD-10-CM

## 2021-03-31 PROCEDURE — 99499 UNLISTED E&M SERVICE: CPT | Mod: S$GLB,,, | Performed by: OBSTETRICS & GYNECOLOGY

## 2021-03-31 PROCEDURE — 11980 PR IMPLANT,HORMONE,SUBCUTANEOUS: ICD-10-PCS | Mod: ,,, | Performed by: OBSTETRICS & GYNECOLOGY

## 2021-03-31 PROCEDURE — 11980 IMPLANT HORMONE PELLET(S): CPT | Mod: ,,, | Performed by: OBSTETRICS & GYNECOLOGY

## 2021-03-31 PROCEDURE — 99499 NO LOS: ICD-10-PCS | Mod: S$GLB,,, | Performed by: OBSTETRICS & GYNECOLOGY

## 2021-04-26 RX ORDER — PROGESTERONE 200 MG/1
200 CAPSULE ORAL DAILY
Qty: 30 CAPSULE | Refills: 0 | Status: SHIPPED | OUTPATIENT
Start: 2021-04-26 | End: 2021-05-25 | Stop reason: SDUPTHER

## 2021-05-25 RX ORDER — PROGESTERONE 200 MG/1
200 CAPSULE ORAL DAILY
Qty: 30 CAPSULE | Refills: 0 | Status: SHIPPED | OUTPATIENT
Start: 2021-05-25 | End: 2021-06-22 | Stop reason: SDUPTHER

## 2021-06-08 ENCOUNTER — TELEPHONE (OUTPATIENT)
Dept: OBSTETRICS AND GYNECOLOGY | Facility: CLINIC | Age: 51
End: 2021-06-08

## 2021-06-14 ENCOUNTER — TELEPHONE (OUTPATIENT)
Dept: OBSTETRICS AND GYNECOLOGY | Facility: CLINIC | Age: 51
End: 2021-06-14

## 2021-06-22 RX ORDER — LEVOTHYROXINE, LIOTHYRONINE 9.5; 2.25 UG/1; UG/1
15 TABLET ORAL DAILY
Qty: 30 TABLET | Refills: 3 | Status: SHIPPED | OUTPATIENT
Start: 2021-06-22 | End: 2021-10-21 | Stop reason: SDUPTHER

## 2021-06-22 RX ORDER — PROGESTERONE 200 MG/1
200 CAPSULE ORAL DAILY
Qty: 30 CAPSULE | Refills: 0 | Status: SHIPPED | OUTPATIENT
Start: 2021-06-22 | End: 2021-07-22 | Stop reason: SDUPTHER

## 2021-07-06 ENCOUNTER — PROCEDURE VISIT (OUTPATIENT)
Dept: OBSTETRICS AND GYNECOLOGY | Facility: CLINIC | Age: 51
End: 2021-07-06
Payer: COMMERCIAL

## 2021-07-06 VITALS — BODY MASS INDEX: 24.55 KG/M2 | HEIGHT: 68 IN | WEIGHT: 162 LBS

## 2021-07-06 DIAGNOSIS — Z79.890 HORMONE REPLACEMENT THERAPY (HRT): ICD-10-CM

## 2021-07-06 DIAGNOSIS — Z12.31 SCREENING MAMMOGRAM, ENCOUNTER FOR: Primary | ICD-10-CM

## 2021-07-06 DIAGNOSIS — N95.1 MENOPAUSAL AND FEMALE CLIMACTERIC STATES: ICD-10-CM

## 2021-07-06 DIAGNOSIS — N64.4 BREAST PAIN, LEFT: ICD-10-CM

## 2021-07-06 DIAGNOSIS — R53.83 FATIGUE, UNSPECIFIED TYPE: ICD-10-CM

## 2021-07-06 PROCEDURE — 99499 UNLISTED E&M SERVICE: CPT | Mod: S$GLB,,, | Performed by: OBSTETRICS & GYNECOLOGY

## 2021-07-06 PROCEDURE — 99499 NO LOS: ICD-10-PCS | Mod: S$GLB,,, | Performed by: OBSTETRICS & GYNECOLOGY

## 2021-07-06 PROCEDURE — 11980 PR IMPLANT,HORMONE,SUBCUTANEOUS: ICD-10-PCS | Mod: ,,, | Performed by: OBSTETRICS & GYNECOLOGY

## 2021-07-06 PROCEDURE — 11980 IMPLANT HORMONE PELLET(S): CPT | Mod: ,,, | Performed by: OBSTETRICS & GYNECOLOGY

## 2021-07-14 ENCOUNTER — HOSPITAL ENCOUNTER (OUTPATIENT)
Dept: RADIOLOGY | Facility: HOSPITAL | Age: 51
Discharge: HOME | End: 2021-07-14
Attending: OBSTETRICS & GYNECOLOGY
Payer: COMMERCIAL

## 2021-07-14 ENCOUNTER — PROCEDURE VISIT (OUTPATIENT)
Dept: OBSTETRICS AND GYNECOLOGY | Facility: CLINIC | Age: 51
End: 2021-07-14
Payer: COMMERCIAL

## 2021-07-14 VITALS
BODY MASS INDEX: 24.64 KG/M2 | HEIGHT: 67 IN | WEIGHT: 157 LBS | HEIGHT: 67 IN | WEIGHT: 157 LBS | BODY MASS INDEX: 24.64 KG/M2

## 2021-07-14 DIAGNOSIS — Z12.31 SCREENING MAMMOGRAM, ENCOUNTER FOR: ICD-10-CM

## 2021-07-14 DIAGNOSIS — Z79.890 HORMONE REPLACEMENT THERAPY (HRT): Primary | ICD-10-CM

## 2021-07-14 PROCEDURE — 99499 NO LOS: ICD-10-PCS | Mod: S$GLB,,, | Performed by: OBSTETRICS & GYNECOLOGY

## 2021-07-14 PROCEDURE — 99499 UNLISTED E&M SERVICE: CPT | Mod: S$GLB,,, | Performed by: OBSTETRICS & GYNECOLOGY

## 2021-07-14 PROCEDURE — 77067 SCR MAMMO BI INCL CAD: CPT | Mod: TC

## 2021-07-22 ENCOUNTER — TELEPHONE (OUTPATIENT)
Dept: OBSTETRICS AND GYNECOLOGY | Facility: CLINIC | Age: 51
End: 2021-07-22

## 2021-07-22 RX ORDER — PROGESTERONE 200 MG/1
200 CAPSULE ORAL DAILY
Qty: 30 CAPSULE | Refills: 0 | Status: SHIPPED | OUTPATIENT
Start: 2021-07-22 | End: 2021-08-23 | Stop reason: SDUPTHER

## 2021-08-23 RX ORDER — PROGESTERONE 200 MG/1
200 CAPSULE ORAL DAILY
Qty: 30 CAPSULE | Refills: 0 | Status: SHIPPED | OUTPATIENT
Start: 2021-08-23 | End: 2021-09-21 | Stop reason: SDUPTHER

## 2021-09-21 RX ORDER — PROGESTERONE 200 MG/1
200 CAPSULE ORAL DAILY
Qty: 30 CAPSULE | Refills: 0 | Status: SHIPPED | OUTPATIENT
Start: 2021-09-21 | End: 2021-10-21 | Stop reason: SDUPTHER

## 2021-09-27 ENCOUNTER — TELEPHONE (OUTPATIENT)
Dept: OBSTETRICS AND GYNECOLOGY | Facility: CLINIC | Age: 51
End: 2021-09-27

## 2021-10-12 ENCOUNTER — PROCEDURE VISIT (OUTPATIENT)
Dept: OBSTETRICS AND GYNECOLOGY | Facility: CLINIC | Age: 51
End: 2021-10-12
Payer: COMMERCIAL

## 2021-10-12 VITALS
BODY MASS INDEX: 24.64 KG/M2 | DIASTOLIC BLOOD PRESSURE: 81 MMHG | HEIGHT: 67 IN | SYSTOLIC BLOOD PRESSURE: 134 MMHG | HEART RATE: 83 BPM | WEIGHT: 157 LBS

## 2021-10-12 DIAGNOSIS — N95.1 MENOPAUSAL AND FEMALE CLIMACTERIC STATES: ICD-10-CM

## 2021-10-12 DIAGNOSIS — R53.83 FATIGUE, UNSPECIFIED TYPE: ICD-10-CM

## 2021-10-12 DIAGNOSIS — Z79.890 HORMONE REPLACEMENT THERAPY (HRT): Primary | ICD-10-CM

## 2021-10-12 PROCEDURE — 11980 PR IMPLANT,HORMONE,SUBCUTANEOUS: ICD-10-PCS | Mod: S$GLB,,, | Performed by: OBSTETRICS & GYNECOLOGY

## 2021-10-12 PROCEDURE — 11980 IMPLANT HORMONE PELLET(S): CPT | Mod: S$GLB,,, | Performed by: OBSTETRICS & GYNECOLOGY

## 2021-10-12 PROCEDURE — 99499 UNLISTED E&M SERVICE: CPT | Mod: S$GLB,,, | Performed by: OBSTETRICS & GYNECOLOGY

## 2021-10-12 PROCEDURE — 99499 NO LOS: ICD-10-PCS | Mod: S$GLB,,, | Performed by: OBSTETRICS & GYNECOLOGY

## 2021-10-21 RX ORDER — LEVOTHYROXINE, LIOTHYRONINE 9.5; 2.25 UG/1; UG/1
15 TABLET ORAL DAILY
Qty: 30 TABLET | Refills: 3 | Status: SHIPPED | OUTPATIENT
Start: 2021-10-21 | End: 2022-02-18 | Stop reason: SDUPTHER

## 2021-10-21 RX ORDER — PROGESTERONE 200 MG/1
200 CAPSULE ORAL DAILY
Qty: 30 CAPSULE | Refills: 0 | Status: SHIPPED | OUTPATIENT
Start: 2021-10-21 | End: 2022-02-18 | Stop reason: SDUPTHER

## 2021-11-22 ENCOUNTER — TELEPHONE (OUTPATIENT)
Dept: OBSTETRICS AND GYNECOLOGY | Facility: CLINIC | Age: 51
End: 2021-11-22
Payer: COMMERCIAL

## 2022-02-11 ENCOUNTER — TELEPHONE (OUTPATIENT)
Dept: OBSTETRICS AND GYNECOLOGY | Facility: CLINIC | Age: 52
End: 2022-02-11
Payer: COMMERCIAL

## 2022-02-18 ENCOUNTER — TELEPHONE (OUTPATIENT)
Dept: OBSTETRICS AND GYNECOLOGY | Facility: CLINIC | Age: 52
End: 2022-02-18
Payer: COMMERCIAL

## 2022-02-18 RX ORDER — PROGESTERONE 200 MG/1
200 CAPSULE ORAL DAILY
Qty: 30 CAPSULE | Refills: 0 | Status: SHIPPED | OUTPATIENT
Start: 2022-02-18 | End: 2022-03-22 | Stop reason: SDUPTHER

## 2022-02-18 RX ORDER — LEVOTHYROXINE, LIOTHYRONINE 9.5; 2.25 UG/1; UG/1
15 TABLET ORAL DAILY
Qty: 30 TABLET | Refills: 3 | Status: SHIPPED | OUTPATIENT
Start: 2022-02-18 | End: 2022-06-20 | Stop reason: SDUPTHER

## 2022-02-24 ENCOUNTER — PROCEDURE VISIT (OUTPATIENT)
Dept: OBSTETRICS AND GYNECOLOGY | Facility: CLINIC | Age: 52
End: 2022-02-24
Payer: COMMERCIAL

## 2022-02-24 VITALS
WEIGHT: 153 LBS | HEART RATE: 93 BPM | SYSTOLIC BLOOD PRESSURE: 142 MMHG | BODY MASS INDEX: 23.96 KG/M2 | DIASTOLIC BLOOD PRESSURE: 99 MMHG

## 2022-02-24 DIAGNOSIS — N95.1 MENOPAUSAL AND FEMALE CLIMACTERIC STATES: ICD-10-CM

## 2022-02-24 DIAGNOSIS — Z79.890 HORMONE REPLACEMENT THERAPY (HRT): Primary | ICD-10-CM

## 2022-02-24 DIAGNOSIS — R53.83 FATIGUE, UNSPECIFIED TYPE: ICD-10-CM

## 2022-02-24 PROCEDURE — 11980 IMPLANT HORMONE PELLET(S): CPT | Mod: S$GLB,,, | Performed by: OBSTETRICS & GYNECOLOGY

## 2022-02-24 PROCEDURE — 11980 PR IMPLANT,HORMONE,SUBCUTANEOUS: ICD-10-PCS | Mod: S$GLB,,, | Performed by: OBSTETRICS & GYNECOLOGY

## 2022-02-24 NOTE — PROCEDURES
Procedures   HORMONE PELLET INSERTION      SUBJECTIVE:   Liberty Reed is a 51 y.o. female who presents for hormone pellet placement.  We have already discussed this procedure, including option of not performing procedure, technique of procedure and potential for scarring at a recent visit.     OBJECTIVE:   Patient appears well. Vitals are normal.  Skin:  left buttock within normal limits    ASSESSMENT:   Hormone replacement therapy  Menopausal symptoms  fatigue    PLAN:   After informed consent was obtained, using chlorhexidine for cleansing and 1% Lidocaine with epinephrine for anesthetic, with sterile technique, a 3 mm skin incision was made in the left buttock.  A trocar was placed in hormone pellet insertion was performed.  Benzoin, Steri-Strips, pressure dressing with Tegaderm, and wound care instructions provided.  Be alert for any signs of cutaneous infection. The procedure was well tolerated without complications. Follow up:  Approximately 3 months for next insertion.  Patient instructed to call prior to next slated time it so that pellets can be ordered.  No gluteal exercises or tub baths for 3 days.    PELLETS:  Testosterone 87.5 mg, LOT# [id number], exp 03/31/2022  Testosterone 50 mg, LOT# [id number], exp 03/13/2022  Estradiol 15 mg, LOT# [id number], exp 03/27/2022

## 2022-03-22 ENCOUNTER — TELEPHONE (OUTPATIENT)
Dept: OBSTETRICS AND GYNECOLOGY | Facility: CLINIC | Age: 52
End: 2022-03-22
Payer: COMMERCIAL

## 2022-03-22 RX ORDER — PROGESTERONE 200 MG/1
200 CAPSULE ORAL DAILY
Qty: 30 CAPSULE | Refills: 0 | Status: SHIPPED | OUTPATIENT
Start: 2022-03-22 | End: 2022-04-20 | Stop reason: SDUPTHER

## 2022-04-20 ENCOUNTER — TELEPHONE (OUTPATIENT)
Dept: OBSTETRICS AND GYNECOLOGY | Facility: CLINIC | Age: 52
End: 2022-04-20
Payer: COMMERCIAL

## 2022-04-20 RX ORDER — PROGESTERONE 200 MG/1
200 CAPSULE ORAL DAILY
Qty: 30 CAPSULE | Refills: 12 | Status: SHIPPED | OUTPATIENT
Start: 2022-04-20 | End: 2023-04-21

## 2022-05-19 ENCOUNTER — TELEPHONE (OUTPATIENT)
Dept: OBSTETRICS AND GYNECOLOGY | Facility: CLINIC | Age: 52
End: 2022-05-19
Payer: COMMERCIAL

## 2022-06-09 ENCOUNTER — PROCEDURE VISIT (OUTPATIENT)
Dept: OBSTETRICS AND GYNECOLOGY | Facility: CLINIC | Age: 52
End: 2022-06-09
Payer: COMMERCIAL

## 2022-06-09 VITALS — DIASTOLIC BLOOD PRESSURE: 87 MMHG | SYSTOLIC BLOOD PRESSURE: 141 MMHG | BODY MASS INDEX: 24.59 KG/M2 | WEIGHT: 157 LBS

## 2022-06-09 DIAGNOSIS — R53.83 FATIGUE, UNSPECIFIED TYPE: ICD-10-CM

## 2022-06-09 DIAGNOSIS — N95.1 MENOPAUSAL AND FEMALE CLIMACTERIC STATES: ICD-10-CM

## 2022-06-09 DIAGNOSIS — Z79.890 HORMONE REPLACEMENT THERAPY (HRT): Primary | ICD-10-CM

## 2022-06-09 PROCEDURE — 11980 IMPLANT HORMONE PELLET(S): CPT | Mod: S$GLB,,, | Performed by: OBSTETRICS & GYNECOLOGY

## 2022-06-09 PROCEDURE — 11980 PR IMPLANT,HORMONE,SUBCUTANEOUS: ICD-10-PCS | Mod: S$GLB,,, | Performed by: OBSTETRICS & GYNECOLOGY

## 2022-06-09 NOTE — PROCEDURES
Procedures   HORMONE PELLET INSERTION      SUBJECTIVE:   Liberty Reed is a 52 y.o. female who presents for hormone pellet placement.  We have already discussed this procedure, including option of not performing procedure, technique of procedure and potential for scarring at a recent visit.     OBJECTIVE:   Patient appears well. Vitals are normal.  Skin:  right buttock within normal limits    ASSESSMENT:   Hormone replacement therapy  Hot flashes  Menopausal symptoms    PROCEDURE:   Patient's pellets identified and verified with her. Time out performed.  After informed consent was obtained, using chlorhexidine for cleansing and 10 cc's 1% Lidocaine with epinephrine for anesthetic, with sterile technique, a 3 mm skin incision was made in the right buttock.  A trocar was placed in hormone pellet insertion was performed.  Benzoin, Steri-Strips, pressure dressing with Tegaderm, and wound care instructions provided.  Be alert for any signs of cutaneous infection. The procedure was well tolerated without complications.     PLAN:  Follow up:  Approximately 3 months for next insertion.  Patient instructed to call prior to next slated time it so that pellets can be ordered.  No gluteal exercises or tub baths for 3 days.    PELLETS:  Testosterone 87.5 mg, LOT# [id number], exp 07/08/2022  Testosterone 50 mg, LOT# [id number], exp 07/04/2022  Estradiol 15 mg, LOT#[id number], exp 06/11/2022

## 2022-06-20 RX ORDER — LEVOTHYROXINE, LIOTHYRONINE 9.5; 2.25 UG/1; UG/1
15 TABLET ORAL DAILY
Qty: 30 TABLET | Refills: 3 | Status: SHIPPED | OUTPATIENT
Start: 2022-06-20 | End: 2022-10-19 | Stop reason: SDUPTHER

## 2022-09-06 ENCOUNTER — TELEPHONE (OUTPATIENT)
Dept: OBSTETRICS AND GYNECOLOGY | Facility: CLINIC | Age: 52
End: 2022-09-06
Payer: COMMERCIAL

## 2022-09-13 ENCOUNTER — PROCEDURE VISIT (OUTPATIENT)
Dept: OBSTETRICS AND GYNECOLOGY | Facility: CLINIC | Age: 52
End: 2022-09-13
Payer: COMMERCIAL

## 2022-09-13 VITALS — WEIGHT: 158 LBS | SYSTOLIC BLOOD PRESSURE: 142 MMHG | DIASTOLIC BLOOD PRESSURE: 94 MMHG | BODY MASS INDEX: 24.75 KG/M2

## 2022-09-13 DIAGNOSIS — Z79.890 HORMONE REPLACEMENT THERAPY (HRT): Primary | ICD-10-CM

## 2022-09-13 DIAGNOSIS — R53.83 FATIGUE, UNSPECIFIED TYPE: ICD-10-CM

## 2022-09-13 DIAGNOSIS — N95.1 MENOPAUSAL AND FEMALE CLIMACTERIC STATES: ICD-10-CM

## 2022-09-13 PROCEDURE — 11980 IMPLANT HORMONE PELLET(S): CPT | Mod: S$GLB,,, | Performed by: OBSTETRICS & GYNECOLOGY

## 2022-09-13 PROCEDURE — 11980 PR IMPLANT,HORMONE,SUBCUTANEOUS: ICD-10-PCS | Mod: S$GLB,,, | Performed by: OBSTETRICS & GYNECOLOGY

## 2022-09-13 NOTE — PROCEDURES
Procedures  HORMONE PELLET INSERTION      SUBJECTIVE:   Liberty Reed is a 52 y.o. female who presents for hormone pellet placement.  We have already discussed this procedure, including option of not performing procedure, technique of procedure and potential for scarring at a recent visit.     OBJECTIVE:   Patient appears well. Vitals are normal.  Skin:  left buttock within normal limits    ASSESSMENT:   Hormone replacement therapy  Hot flashes  Menopausal symptoms    PROCEDURE:   Patient's pellets identified and verified with her. Time out performed.  After informed consent was obtained, using chlorhexidine for cleansing and 10 cc's 1% Lidocaine with epinephrine for anesthetic, with sterile technique, a 3 mm skin incision was made in the left buttock.  A trocar was placed in hormone pellet insertion was performed.  Benzoin, Steri-Strips, pressure dressing with Tegaderm, and wound care instructions provided.  Be alert for any signs of cutaneous infection. The procedure was well tolerated without complications.     PLAN:  Follow up:  Approximately 3 months for next insertion.  Patient instructed to call prior to next slated time it so that pellets can be ordered.  No gluteal exercises or tub baths for 3 days.    PELLETS:   Testosterone 87.5 mg, LOT# [id number], 10/08/2022  Testosterone 50 mg, LOT# [id number], exp 10/17/2022  Estradiol 15 mg, LOT# [id number], exp 10/03/2022

## 2022-10-19 RX ORDER — LEVOTHYROXINE, LIOTHYRONINE 9.5; 2.25 UG/1; UG/1
15 TABLET ORAL DAILY
Qty: 30 TABLET | Refills: 3 | Status: SHIPPED | OUTPATIENT
Start: 2022-10-19 | End: 2023-02-20 | Stop reason: SDUPTHER

## 2022-12-08 ENCOUNTER — TELEPHONE (OUTPATIENT)
Dept: OBSTETRICS AND GYNECOLOGY | Facility: CLINIC | Age: 52
End: 2022-12-08
Payer: COMMERCIAL

## 2022-12-29 ENCOUNTER — PROCEDURE VISIT (OUTPATIENT)
Dept: OBSTETRICS AND GYNECOLOGY | Facility: CLINIC | Age: 52
End: 2022-12-29
Payer: COMMERCIAL

## 2022-12-29 VITALS
SYSTOLIC BLOOD PRESSURE: 121 MMHG | HEART RATE: 89 BPM | HEIGHT: 67 IN | BODY MASS INDEX: 25.43 KG/M2 | DIASTOLIC BLOOD PRESSURE: 86 MMHG | WEIGHT: 162 LBS

## 2022-12-29 DIAGNOSIS — R53.83 FATIGUE, UNSPECIFIED TYPE: ICD-10-CM

## 2022-12-29 DIAGNOSIS — N95.1 MENOPAUSAL AND FEMALE CLIMACTERIC STATES: ICD-10-CM

## 2022-12-29 DIAGNOSIS — Z12.31 SCREENING MAMMOGRAM, ENCOUNTER FOR: Primary | ICD-10-CM

## 2022-12-29 DIAGNOSIS — Z79.890 HORMONE REPLACEMENT THERAPY (HRT): ICD-10-CM

## 2022-12-29 PROCEDURE — 11980 PR IMPLANT,HORMONE,SUBCUTANEOUS: ICD-10-PCS | Mod: S$GLB,,, | Performed by: OBSTETRICS & GYNECOLOGY

## 2022-12-29 PROCEDURE — 11980 IMPLANT HORMONE PELLET(S): CPT | Mod: S$GLB,,, | Performed by: OBSTETRICS & GYNECOLOGY

## 2022-12-29 NOTE — PROCEDURES
Procedures  HORMONE PELLET INSERTION      SUBJECTIVE:   Liberty Reed is a 52 y.o. female who presents for hormone pellet placement.  We have already discussed this procedure, including option of not performing procedure, technique of procedure and potential for scarring at a recent visit.     OBJECTIVE:   Patient appears well. Vitals are normal.  Skin:  right buttock within normal limits    ASSESSMENT:   Hormone replacement therapy  Hot flashes  Menopausal symptoms    PROCEDURE:   Patient's pellets identified and verified with her. Time out performed.  After informed consent was obtained, using chlorhexidine for cleansing and 10 cc's 1% Lidocaine with epinephrine for anesthetic, with sterile technique, a 3 mm skin incision was made in the right buttock.  A trocar was placed in hormone pellet insertion was performed.  Benzoin, Steri-Strips, pressure dressing with Tegaderm, and wound care instructions provided.  Be alert for any signs of cutaneous infection. The procedure was well tolerated without complications.     PLAN:  Follow up:  Approximately 3 months for next insertion.  Patient instructed to call prior to next slated time it so that pellets can be ordered.  No gluteal exercises or tub baths for 3 days.    PELLETS:   Testosterone 87.5 mg, LOT# [id number], exp 01/22/2023  Testosterone 50 mg, LOT# [id number], exp 01/19/2023  Estradiol 15 mg, LOT# [id number], exp 01/19/2023

## 2023-02-20 ENCOUNTER — TELEPHONE (OUTPATIENT)
Dept: OBSTETRICS AND GYNECOLOGY | Facility: CLINIC | Age: 53
End: 2023-02-20
Payer: COMMERCIAL

## 2023-02-20 RX ORDER — LEVOTHYROXINE, LIOTHYRONINE 9.5; 2.25 UG/1; UG/1
15 TABLET ORAL DAILY
Qty: 30 TABLET | Refills: 12 | Status: SHIPPED | OUTPATIENT
Start: 2023-02-20 | End: 2024-03-14

## 2023-03-08 ENCOUNTER — OFFICE VISIT (OUTPATIENT)
Dept: OBSTETRICS AND GYNECOLOGY | Facility: CLINIC | Age: 53
End: 2023-03-08
Payer: COMMERCIAL

## 2023-03-08 ENCOUNTER — HOSPITAL ENCOUNTER (OUTPATIENT)
Dept: RADIOLOGY | Facility: HOSPITAL | Age: 53
Discharge: HOME | End: 2023-03-08
Attending: OBSTETRICS & GYNECOLOGY
Payer: COMMERCIAL

## 2023-03-08 VITALS — WEIGHT: 162 LBS | BODY MASS INDEX: 25.43 KG/M2 | HEIGHT: 67 IN

## 2023-03-08 VITALS
SYSTOLIC BLOOD PRESSURE: 128 MMHG | HEART RATE: 81 BPM | BODY MASS INDEX: 25.22 KG/M2 | DIASTOLIC BLOOD PRESSURE: 84 MMHG | WEIGHT: 161 LBS

## 2023-03-08 DIAGNOSIS — Z01.419 ENCOUNTER FOR ANNUAL ROUTINE GYNECOLOGICAL EXAMINATION: ICD-10-CM

## 2023-03-08 DIAGNOSIS — Z12.31 SCREENING MAMMOGRAM, ENCOUNTER FOR: ICD-10-CM

## 2023-03-08 DIAGNOSIS — Z12.4 SCREENING FOR MALIGNANT NEOPLASM OF THE CERVIX: Primary | ICD-10-CM

## 2023-03-08 PROCEDURE — 99999 PR PBB SHADOW E&M-EST. PATIENT-LVL III: ICD-10-PCS | Mod: PBBFAC,,, | Performed by: OBSTETRICS & GYNECOLOGY

## 2023-03-08 PROCEDURE — 99999 PR PBB SHADOW E&M-EST. PATIENT-LVL III: CPT | Mod: PBBFAC,,, | Performed by: OBSTETRICS & GYNECOLOGY

## 2023-03-08 PROCEDURE — 1159F PR MEDICATION LIST DOCUMENTED IN MEDICAL RECORD: ICD-10-PCS | Mod: CPTII,S$GLB,, | Performed by: OBSTETRICS & GYNECOLOGY

## 2023-03-08 PROCEDURE — 99396 PR PREVENTIVE VISIT,EST,40-64: ICD-10-PCS | Mod: S$GLB,,, | Performed by: OBSTETRICS & GYNECOLOGY

## 2023-03-08 PROCEDURE — 3079F DIAST BP 80-89 MM HG: CPT | Mod: CPTII,S$GLB,, | Performed by: OBSTETRICS & GYNECOLOGY

## 2023-03-08 PROCEDURE — 99396 PREV VISIT EST AGE 40-64: CPT | Mod: S$GLB,,, | Performed by: OBSTETRICS & GYNECOLOGY

## 2023-03-08 PROCEDURE — 77067 SCR MAMMO BI INCL CAD: CPT | Mod: TC

## 2023-03-08 PROCEDURE — 3008F BODY MASS INDEX DOCD: CPT | Mod: CPTII,S$GLB,, | Performed by: OBSTETRICS & GYNECOLOGY

## 2023-03-08 PROCEDURE — 3079F PR MOST RECENT DIASTOLIC BLOOD PRESSURE 80-89 MM HG: ICD-10-PCS | Mod: CPTII,S$GLB,, | Performed by: OBSTETRICS & GYNECOLOGY

## 2023-03-08 PROCEDURE — 3074F PR MOST RECENT SYSTOLIC BLOOD PRESSURE < 130 MM HG: ICD-10-PCS | Mod: CPTII,S$GLB,, | Performed by: OBSTETRICS & GYNECOLOGY

## 2023-03-08 PROCEDURE — 1159F MED LIST DOCD IN RCRD: CPT | Mod: CPTII,S$GLB,, | Performed by: OBSTETRICS & GYNECOLOGY

## 2023-03-08 PROCEDURE — 3008F PR BODY MASS INDEX (BMI) DOCUMENTED: ICD-10-PCS | Mod: CPTII,S$GLB,, | Performed by: OBSTETRICS & GYNECOLOGY

## 2023-03-08 PROCEDURE — 3074F SYST BP LT 130 MM HG: CPT | Mod: CPTII,S$GLB,, | Performed by: OBSTETRICS & GYNECOLOGY

## 2023-03-08 NOTE — PROGRESS NOTES
Subjective:    Patient ID: Liberty Reed is a 52 y.o. female.     Chief Complaint: Annual Well Woman Exam     History of Present Illness:  Liberty presents today for Annual Well Woman exam. .Patient's last menstrual period was 2020 (approximate).. She is currently using Estrogen Pellet Therapy and she reports problems with Hot flashes and Night sweats. She denies breast tenderness, denies masses, denies nipple discharge. She denies difficulty with urination. Bowel movements have not significantly changed.     Currently on 15 mg estradiol and 137.5 mg testosterone pellets.  Noticing more fatigue and mood swings.  She is concerned that she needs a higher dose of hormone replacement therapy        Menstrual History:   Patient's last menstrual period was 2020 (approximate)..     OB History          2    Para   2    Term   2            AB        Living             SAB        IAB        Ectopic        Multiple        Live Births                     Review of Systems   Constitutional:  Negative for chills, fatigue and fever.   Respiratory:  Negative for shortness of breath.    Cardiovascular:  Negative for chest pain.   Gastrointestinal:  Negative for abdominal pain, constipation, diarrhea and nausea.   Genitourinary:  Negative for bladder incontinence, dysuria, hot flashes, pelvic pain and vaginal bleeding.   Neurological:  Negative for headaches.   Psychiatric/Behavioral:  Negative for depression.        Objective:    Vital Signs:  Vitals:    23 0920   BP: 128/84   Pulse: 81       Physical Exam:  General:  alert, no distress   Skin:  Skin color, texture, turgor normal. No rashes or lesions   HEENT:  conjunctivae/corneas clear. PERRL.   Neck: supple, trachea midline, no adenopathy or thyromegaly   Respiratory:  clear to auscultation bilaterally   Heart:  regular rate and rhythm, S1, S2 normal, no murmur, click, rub or gallop   Breasts: Left Breast: Nipple protruding. No nipple discharge. No  palpable masses, erythema, skin changes, tenderness, or adenopathy.  Right Breast: Nipple protruding. No nipple discharge. No palpable masses, erythema, skin changes, tenderness, or adenopathy.   Abdomen:  Soft, non-tender. Bowel sounds normal. No masses,  no organomegaly   Pelvis: External genitalia: normal general appearance  Urinary system: urethral meatus normal, bladder nontender  Vaginal: normal mucosa without prolapse or lesions  Cervix: normal appearance  Uterus: normal single, nontender  Adnexa: normal bimanual exam   Extremities: Normal ROM; no edema, no cyanosis   Neurological: Normal strength and tone. No focal numbness or weakness. Reflexes 2+ and equal.   Psychiatric: normal mood, speech, dress, and thought processes       Will increase pellet dose to 150 mg testosterone and 15 mg estradiol.  Will obtain labs 6 wks after placement      Assessment:      1. Screening for malignant neoplasm of the cervix    2. Encounter for annual routine gynecological examination          Plan:      Screening for malignant neoplasm of the cervix  -     Liquid-Based Pap Smear, Screening    Encounter for annual routine gynecological examination    Other orders  -     HPV High Risk Genotypes, PCR        Health Maintenance and Screening:   Liberty was counseled on A.C.O.G. Pap guidelines and recommendations for yearly pelvic exams in addition to recommendations for yearly mammograms and monthly self breast exams, and adequate calcium and vitamin D in her diet.       Magaly Pedersen MD FACOG    03/08/2023  9:36 AM

## 2023-03-14 LAB
HPV16+18+H RISK 12 DNA CVX-IMP: NORMAL
LIQUID BASED PAP SMEAR, SCREENING: NORMAL

## 2023-03-21 ENCOUNTER — TELEPHONE (OUTPATIENT)
Dept: OBSTETRICS AND GYNECOLOGY | Facility: CLINIC | Age: 53
End: 2023-03-21
Payer: COMMERCIAL

## 2023-03-21 NOTE — TELEPHONE ENCOUNTER
Pellets with new dosing were previously ordered on 03/08/23.    Did we receive them in office? Did pt call pellet pharm?

## 2023-03-30 ENCOUNTER — PROCEDURE VISIT (OUTPATIENT)
Dept: OBSTETRICS AND GYNECOLOGY | Facility: CLINIC | Age: 53
End: 2023-03-30
Payer: COMMERCIAL

## 2023-03-30 VITALS
WEIGHT: 159 LBS | BODY MASS INDEX: 24.96 KG/M2 | SYSTOLIC BLOOD PRESSURE: 131 MMHG | DIASTOLIC BLOOD PRESSURE: 92 MMHG | HEART RATE: 81 BPM | HEIGHT: 67 IN

## 2023-03-30 DIAGNOSIS — R53.83 FATIGUE, UNSPECIFIED TYPE: ICD-10-CM

## 2023-03-30 DIAGNOSIS — Z79.890 HORMONE REPLACEMENT THERAPY (HRT): ICD-10-CM

## 2023-03-30 DIAGNOSIS — N95.1 MENOPAUSAL AND FEMALE CLIMACTERIC STATES: Primary | ICD-10-CM

## 2023-03-30 PROCEDURE — 11980 PR IMPLANT,HORMONE,SUBCUTANEOUS: ICD-10-PCS | Mod: S$GLB,,, | Performed by: OBSTETRICS & GYNECOLOGY

## 2023-03-30 PROCEDURE — 11980 IMPLANT HORMONE PELLET(S): CPT | Mod: S$GLB,,, | Performed by: OBSTETRICS & GYNECOLOGY

## 2023-03-30 NOTE — PROCEDURES
Procedures  HORMONE PELLET INSERTION      SUBJECTIVE:   Liberty Reed is a 52 y.o. female who presents for hormone pellet placement.  We have already discussed this procedure, including option of not performing procedure, technique of procedure and potential for scarring at a recent visit.     OBJECTIVE:   Patient appears well. Vitals are normal.  Skin:  left buttock within normal limits    ASSESSMENT:   Hormone replacement therapy  Hot flashes  Menopausal symptoms    PROCEDURE:   Patient's pellets identified and verified with her. Time out performed.  After informed consent was obtained, using chlorhexidine for cleansing and 10 cc's 1% Lidocaine with epinephrine for anesthetic, with sterile technique, a 3 mm skin incision was made in the left buttock.  A trocar was placed in hormone pellet insertion was performed.  Benzoin, Steri-Strips, pressure dressing with Tegaderm, and wound care instructions provided.  Be alert for any signs of cutaneous infection. The procedure was well tolerated without complications.     PLAN:  Follow up:  Approximately 3 months for next insertion.  Patient instructed to call prior to next slated time it so that pellets can be ordered.  No gluteal exercises or tub baths for 3 days.    PELLETS:   Testosterone 100 mg, LOT# [id number], exp 04/30/2023  Testosterone 50 mg, LOT# [id number], exp 05/04/2023  Estradiol 15 mg, LOT# [id number], exp 05/06/2023

## 2023-07-05 ENCOUNTER — PROCEDURE VISIT (OUTPATIENT)
Dept: OBSTETRICS AND GYNECOLOGY | Facility: CLINIC | Age: 53
End: 2023-07-05
Payer: COMMERCIAL

## 2023-07-05 VITALS — DIASTOLIC BLOOD PRESSURE: 62 MMHG | WEIGHT: 160 LBS | BODY MASS INDEX: 25.06 KG/M2 | SYSTOLIC BLOOD PRESSURE: 128 MMHG

## 2023-07-05 DIAGNOSIS — N95.1 MENOPAUSAL AND FEMALE CLIMACTERIC STATES: Primary | ICD-10-CM

## 2023-07-05 DIAGNOSIS — R53.83 FATIGUE, UNSPECIFIED TYPE: ICD-10-CM

## 2023-07-05 DIAGNOSIS — Z79.890 HORMONE REPLACEMENT THERAPY (HRT): ICD-10-CM

## 2023-07-05 PROCEDURE — 11980 IMPLANT HORMONE PELLET(S): CPT | Mod: S$GLB,,, | Performed by: OBSTETRICS & GYNECOLOGY

## 2023-07-05 PROCEDURE — 11980 PR IMPLANT,HORMONE,SUBCUTANEOUS: ICD-10-PCS | Mod: S$GLB,,, | Performed by: OBSTETRICS & GYNECOLOGY

## 2023-07-05 NOTE — PROCEDURES
Procedures  HORMONE PELLET INSERTION      SUBJECTIVE:   Liberty Reed is a 53 y.o. female who presents for hormone pellet placement.  We have already discussed this procedure, including option of not performing procedure, technique of procedure and potential for scarring at a recent visit.     OBJECTIVE:   Patient appears well. Vitals are normal.  Skin:  right buttock within normal limits    ASSESSMENT:   Hormone replacement therapy  Hot flashes  Menopausal symptoms    PROCEDURE:   Patient's pellets identified and verified with her. Time out performed.  After informed consent was obtained, using chlorhexidine for cleansing and 10 cc's 1% Lidocaine with epinephrine for anesthetic, with sterile technique, a 3 mm skin incision was made in the right buttock.  A trocar was placed in hormone pellet insertion was performed.  Benzoin, Steri-Strips, pressure dressing with Tegaderm, and wound care instructions provided.  Be alert for any signs of cutaneous infection. The procedure was well tolerated without complications.     PLAN:  Follow up:  Approximately 3 months for next insertion.  Patient instructed to call prior to next slated time it so that pellets can be ordered.  No gluteal exercises or tub baths for 3 days.    PELLETS:   Testosterone 100 mg, LOT# [id number], exp 08/04/2023  Testosterone 50 mg, LOT# [id number], exp 08/06/2023  Estradiol 15 mg, LOT# [id number], exp 08/04/2023

## 2024-03-12 ENCOUNTER — OFFICE VISIT (OUTPATIENT)
Dept: OBSTETRICS AND GYNECOLOGY | Facility: CLINIC | Age: 54
End: 2024-03-12
Payer: COMMERCIAL

## 2024-03-12 VITALS
DIASTOLIC BLOOD PRESSURE: 67 MMHG | HEART RATE: 90 BPM | WEIGHT: 165 LBS | SYSTOLIC BLOOD PRESSURE: 125 MMHG | BODY MASS INDEX: 25.84 KG/M2

## 2024-03-12 DIAGNOSIS — Z12.31 SCREENING MAMMOGRAM, ENCOUNTER FOR: ICD-10-CM

## 2024-03-12 DIAGNOSIS — Z01.419 ENCOUNTER FOR ANNUAL ROUTINE GYNECOLOGICAL EXAMINATION: Primary | ICD-10-CM

## 2024-03-12 DIAGNOSIS — R53.83 FATIGUE, UNSPECIFIED TYPE: ICD-10-CM

## 2024-03-12 DIAGNOSIS — Z98.890 HISTORY OF ENDOMETRIAL ABLATION: ICD-10-CM

## 2024-03-12 DIAGNOSIS — N95.1 MENOPAUSAL AND FEMALE CLIMACTERIC STATES: ICD-10-CM

## 2024-03-12 PROCEDURE — 99999 PR PBB SHADOW E&M-EST. PATIENT-LVL III: CPT | Mod: PBBFAC,,, | Performed by: OBSTETRICS & GYNECOLOGY

## 2024-03-12 PROCEDURE — 3008F BODY MASS INDEX DOCD: CPT | Mod: CPTII,S$GLB,, | Performed by: OBSTETRICS & GYNECOLOGY

## 2024-03-12 PROCEDURE — 3078F DIAST BP <80 MM HG: CPT | Mod: CPTII,S$GLB,, | Performed by: OBSTETRICS & GYNECOLOGY

## 2024-03-12 PROCEDURE — 99396 PREV VISIT EST AGE 40-64: CPT | Mod: S$GLB,,, | Performed by: OBSTETRICS & GYNECOLOGY

## 2024-03-12 PROCEDURE — 1160F RVW MEDS BY RX/DR IN RCRD: CPT | Mod: CPTII,S$GLB,, | Performed by: OBSTETRICS & GYNECOLOGY

## 2024-03-12 PROCEDURE — 1159F MED LIST DOCD IN RCRD: CPT | Mod: CPTII,S$GLB,, | Performed by: OBSTETRICS & GYNECOLOGY

## 2024-03-12 PROCEDURE — 3074F SYST BP LT 130 MM HG: CPT | Mod: CPTII,S$GLB,, | Performed by: OBSTETRICS & GYNECOLOGY

## 2024-03-12 NOTE — PROGRESS NOTES
Subjective:    Patient ID: Liberty Reed is a 53 y.o. female.     Chief Complaint: Annual Well Woman Exam     History of Present Illness:  Liberty presents today for Annual Well Woman exam. .Patient's last menstrual period was 2020 (approximate).. She recently stopped using  estrogen pellet and prometrium therapy  and she reports no problems with hot flashes, night sweats, irritability or vaginal dryness. She denies breast tenderness, denies masses, denies nipple discharge. She denies difficulty with urination. Bowel movements have not significantly changed.       Menstrual History:   Patient's last menstrual period was 2020 (approximate)..     OB History          2    Para   2    Term   2            AB        Living             SAB        IAB        Ectopic        Multiple        Live Births                     Review of Systems   Constitutional:  Positive for fatigue (in afternoons). Negative for chills and fever.   Respiratory:  Negative for shortness of breath.    Cardiovascular:  Negative for chest pain.   Gastrointestinal:  Negative for abdominal pain, constipation, diarrhea and nausea.   Genitourinary:  Negative for bladder incontinence, dysuria, hot flashes, pelvic pain and vaginal bleeding.   Neurological:  Negative for headaches.   Psychiatric/Behavioral:  Negative for depression.          Objective:    Vital Signs:  Vitals:    24 0904   BP: 125/67   Pulse: 90       Physical Exam:  General:  alert, no distress   Skin:  Skin color, texture, turgor normal. No rashes or lesions   HEENT:  conjunctivae/corneas clear. PERRL.   Neck: supple, trachea midline, no adenopathy or thyromegaly   Respiratory:  clear to auscultation bilaterally   Heart:  regular rate and rhythm, S1, S2 normal, no murmur, click, rub or gallop   Breasts: Left Breast: Nipple protruding. No nipple discharge. No palpable masses, erythema, skin changes, tenderness, or adenopathy.  Right Breast: Nipple protruding. No  nipple discharge. No palpable masses, erythema, skin changes, tenderness, or adenopathy.   Abdomen:  Soft, non-tender. Bowel sounds normal. No masses,  no organomegaly   Pelvis: External genitalia: normal general appearance  Urinary system: urethral meatus normal, bladder nontender  Vaginal: normal mucosa without prolapse or lesions  Cervix: normal appearance  Uterus: normal single, nontender  Adnexa: normal bimanual exam   Extremities: Normal ROM; no edema, no cyanosis   Neurological: Normal strength and tone. No focal numbness or weakness. Reflexes 2+ and equal.   Psychiatric: normal mood, speech, dress, and thought processes             Assessment:      1. Encounter for annual routine gynecological examination    2. Menopausal and female climacteric states    3. Fatigue, unspecified type    4. Screening mammogram, encounter for    5. History of endometrial ablation          Plan:      Encounter for annual routine gynecological examination    Menopausal and female climacteric states    Fatigue, unspecified type    Screening mammogram, encounter for  -     Mammo Digital Screening Bilat w/ [facility]; Future; Expected date: 03/12/2024    History of endometrial ablation        Health Maintenance and Screening:   Liberty was counseled on A.C.O.G. Pap guidelines and recommendations for yearly pelvic exams in addition to recommendations for yearly mammograms and monthly self breast exams, and adequate calcium and vitamin D in her diet.     A discussion of needed Health Maintenance Screening was done with Liberty. She was counseled that she is overdue for Mammogram: She will schedule: next week.     Magaly Pedersen MD, FACOG   03/12/2024 9:06 AM

## 2024-03-14 RX ORDER — LEVOTHYROXINE, LIOTHYRONINE 9.5; 2.25 UG/1; UG/1
15 TABLET ORAL DAILY
Qty: 30 TABLET | Refills: 12 | Status: SHIPPED | OUTPATIENT
Start: 2024-03-14

## 2024-04-02 ENCOUNTER — HOSPITAL ENCOUNTER (OUTPATIENT)
Dept: RADIOLOGY | Facility: HOSPITAL | Age: 54
Discharge: HOME | End: 2024-04-02
Attending: OBSTETRICS & GYNECOLOGY
Payer: COMMERCIAL

## 2024-04-02 VITALS — BODY MASS INDEX: 25.9 KG/M2 | WEIGHT: 165 LBS | HEIGHT: 67 IN

## 2024-04-02 DIAGNOSIS — Z12.31 SCREENING MAMMOGRAM, ENCOUNTER FOR: ICD-10-CM

## 2024-04-02 PROCEDURE — 77067 SCR MAMMO BI INCL CAD: CPT | Mod: TC

## 2024-05-02 ENCOUNTER — TELEPHONE (OUTPATIENT)
Dept: OBSTETRICS AND GYNECOLOGY | Facility: CLINIC | Age: 54
End: 2024-05-02
Payer: COMMERCIAL

## 2024-05-02 RX ORDER — PROGESTERONE 200 MG/1
200 CAPSULE ORAL NIGHTLY
Qty: 30 CAPSULE | Refills: 3 | Status: SHIPPED | OUTPATIENT
Start: 2024-05-02 | End: 2025-05-02

## 2024-05-02 RX ORDER — ESTRADIOL 1 MG/1
1 TABLET ORAL DAILY
Qty: 30 TABLET | Refills: 3 | Status: SHIPPED | OUTPATIENT
Start: 2024-05-02 | End: 2025-05-02

## 2024-05-02 NOTE — TELEPHONE ENCOUNTER
Night sweat, mood swings since stopping the hormone pellets, wants to know if she can get something called in to [facility]

## 2025-03-14 ENCOUNTER — OFFICE VISIT (OUTPATIENT)
Dept: OBSTETRICS AND GYNECOLOGY | Facility: CLINIC | Age: 55
End: 2025-03-14
Payer: COMMERCIAL

## 2025-03-14 VITALS
DIASTOLIC BLOOD PRESSURE: 76 MMHG | HEART RATE: 84 BPM | HEIGHT: 67 IN | WEIGHT: 164 LBS | BODY MASS INDEX: 25.74 KG/M2 | SYSTOLIC BLOOD PRESSURE: 133 MMHG

## 2025-03-14 DIAGNOSIS — Z12.31 BREAST CANCER SCREENING BY MAMMOGRAM: Primary | ICD-10-CM

## 2025-03-14 PROCEDURE — 99999 PR PBB SHADOW E&M-EST. PATIENT-LVL III: CPT | Mod: PBBFAC,,, | Performed by: OBSTETRICS & GYNECOLOGY

## 2025-03-14 RX ORDER — ATORVASTATIN CALCIUM 20 MG/1
20 TABLET, FILM COATED ORAL
COMMUNITY
Start: 2025-02-24

## 2025-03-14 NOTE — PROGRESS NOTES
Subjective:    Patient ID: Liberty Reed is a 54 y.o. female.     Chief Complaint: Annual Well Woman Exam     History of Present Illness:  Liberty presents today for Annual Well Woman exam. .Patient's last menstrual period was 2020 (approximate).. She is currently using no hormone therapy (previously on estrace and prometrium)and she reports no problems with hot flashes, night sweats, irritability or vaginal dryness. She denies breast tenderness, denies masses, denies nipple discharge. She denies difficulty with urination. Bowel movements have not significantly changed.       Menstrual History:   Patient's last menstrual period was 2020 (approximate)..     OB History          2    Para   2    Term   2            AB        Living             SAB        IAB        Ectopic        Multiple        Live Births                     Review of Systems   Constitutional:  Negative for chills, fatigue and fever.   Respiratory:  Negative for shortness of breath.    Cardiovascular:  Negative for chest pain.   Gastrointestinal:  Negative for abdominal pain, constipation, diarrhea and nausea.   Genitourinary:  Negative for bladder incontinence, dysuria, hot flashes, pelvic pain and vaginal bleeding.   Neurological:  Negative for headaches.   Psychiatric/Behavioral:  Negative for depression.        Objective:    Vital Signs:  Vitals:    25 0856   BP: 133/76   Pulse: 84       Physical Exam:  General:  alert, no distress   Skin:  Skin color, texture, turgor normal. No rashes or lesions   HEENT:  conjunctivae/corneas clear. PERRL.   Neck: supple, trachea midline, no adenopathy or thyromegaly   Respiratory:  clear to auscultation bilaterally   Heart:  regular rate and rhythm, S1, S2 normal, no murmur, click, rub or gallop   Breasts: Left Breast: Nipple protruding. No nipple discharge. No palpable masses, erythema, skin changes, tenderness, or adenopathy.  Right Breast: Nipple protruding. No nipple discharge.  No palpable masses, erythema, skin changes, tenderness, or adenopathy.   Abdomen:  Soft, non-tender. Bowel sounds normal. No masses,  no organomegaly   Pelvis: External genitalia: normal general appearance  Urinary system: urethral meatus normal, bladder nontender  Vaginal: normal mucosa without prolapse or lesions  Cervix: normal appearance  Uterus: normal single, nontender  Adnexa: normal bimanual exam   Extremities: Normal ROM; no edema, no cyanosis   Neurological: Normal strength and tone. No focal numbness or weakness. Reflexes 2+ and equal.   Psychiatric: normal mood, speech, dress, and thought processes             Assessment:      1. Breast cancer screening by mammogram          Plan:      Breast cancer screening by mammogram  -     Mammo Digital Screening Bilat w/ [facility] ([facility]); Future; Expected date: 04/03/2025        Health Maintenance and Screening:   Liberty was counseled on A.C.O.G. Pap guidelines and recommendations for yearly pelvic exams in addition to recommendations for yearly mammograms and monthly self breast exams, and adequate calcium and vitamin D in her diet.     A discussion of needed Health Maintenance Screening was done with Liberty. She was counseled that she is overdue for Mammogram: She will schedule: today.     Magaly Pedersen MD, FACOG   03/14/2025 9:24 AM

## 2025-03-24 RX ORDER — LEVOTHYROXINE, LIOTHYRONINE 9.5; 2.25 UG/1; UG/1
15 TABLET ORAL DAILY
Qty: 30 TABLET | Refills: 6 | Status: SHIPPED | OUTPATIENT
Start: 2025-03-24

## 2025-03-24 NOTE — TELEPHONE ENCOUNTER
Refill Routing Note   Medication(s) are not appropriate for processing by [facility] for the following reason(s):        Required labs outdated    [facility] action(s):  Defer               Appointments  past 12m or future 3m with PCP    Date Provider   Last Visit   3/14/2025 Magaly Pedersen MD   Next Visit   Visit date not found Magaly Pedersen MD   ED visits in past 90 days: 0        Note composed:1:08 PM 03/24/2025

## 2025-04-29 ENCOUNTER — RESULTS FOLLOW-UP (OUTPATIENT)
Dept: OBSTETRICS AND GYNECOLOGY | Facility: CLINIC | Age: 55
End: 2025-04-29
Payer: COMMERCIAL

## 2025-04-29 ENCOUNTER — HOSPITAL ENCOUNTER (OUTPATIENT)
Dept: RADIOLOGY | Facility: HOSPITAL | Age: 55
Discharge: HOME | End: 2025-04-29
Attending: OBSTETRICS & GYNECOLOGY
Payer: COMMERCIAL

## 2025-04-29 VITALS — WEIGHT: 164 LBS | BODY MASS INDEX: 25.74 KG/M2 | HEIGHT: 67 IN

## 2025-04-29 DIAGNOSIS — Z12.31 BREAST CANCER SCREENING BY MAMMOGRAM: ICD-10-CM

## 2025-04-29 PROCEDURE — 77063 BREAST TOMOSYNTHESIS BI: CPT | Mod: TC

## 2025-04-29 NOTE — PROGRESS NOTES
Please call patient regarding results.  Questionable area of architectural distortion within the deep retroareolar right breast. Spot compression, true lateral tomosynthesis, and ultrasound recommended--please schedule

## 2025-05-01 DIAGNOSIS — R92.8 ABNORMAL MAMMOGRAM: Primary | ICD-10-CM

## 2025-05-07 ENCOUNTER — HOSPITAL ENCOUNTER (OUTPATIENT)
Dept: RADIOLOGY | Facility: HOSPITAL | Age: 55
Discharge: HOME | End: 2025-05-07
Attending: OBSTETRICS & GYNECOLOGY
Payer: COMMERCIAL

## 2025-05-07 DIAGNOSIS — R92.8 ABNORMAL MAMMOGRAM: ICD-10-CM

## 2025-05-07 PROCEDURE — 76642 ULTRASOUND BREAST LIMITED: CPT | Mod: TC,RT

## 2025-05-07 PROCEDURE — 77061 BREAST TOMOSYNTHESIS UNI: CPT | Mod: TC,RT

## 2025-05-08 ENCOUNTER — RESULTS FOLLOW-UP (OUTPATIENT)
Dept: OBSTETRICS AND GYNECOLOGY | Facility: CLINIC | Age: 55
End: 2025-05-08
Payer: COMMERCIAL

## (undated) DEVICE — COVER OVERHEAD SURG LT BLUE

## (undated) DEVICE — SPONGE X-RAY LAP DETCT 18X18IN

## (undated) DEVICE — DEVICE ABLATION NOVASURE DISP

## (undated) DEVICE — GLOVE PROTEXIS LTX MICRO 6

## (undated) DEVICE — TRAY VAG PREP

## (undated) DEVICE — PAD PREP 50/CA

## (undated) DEVICE — GOWN SURGICAL BRTHBL XL

## (undated) DEVICE — LINER GLOVE POWDERFREE SZ 6.5

## (undated) DEVICE — PAD UNDERPAD 30X30

## (undated) DEVICE — SOL NORMAL USPCA 0.9%

## (undated) DEVICE — JELLY LUBRICANT STERILE 4 OZ

## (undated) DEVICE — SET CYSTO IRR DRP CHMBR 84IN

## (undated) DEVICE — CATH 16FR URETHRL RED RUB

## (undated) DEVICE — TOWEL OR STER DISP BLUE 17X27

## (undated) DEVICE — DRESSING TELFA N ADH 3X8

## (undated) DEVICE — GLOVE SURG ULTRA TOUCH 7

## (undated) DEVICE — LABEL BARKLEY 9/16X1-7/8IN

## (undated) DEVICE — COVER WARMING BODY UPPER 78X21

## (undated) DEVICE — PAD SANITARY OB STERILE

## (undated) DEVICE — SOL IRRI STRL WATER 1000ML

## (undated) DEVICE — PACK SURG LITHOTOMY 3 SIRUS

## (undated) DEVICE — UNDERGLOVES BIOGEL PI SZ 7 LF

## (undated) DEVICE — ELECTRODE REM PLYHSV RETURN 9

## (undated) DEVICE — DRAPE UNDERBUTTOCKS PCH STRL

## (undated) DEVICE — CLIPPER BLADE MOD 4406 (CAREF)

## (undated) DEVICE — GAUZE SPONGE XRAY 4X4

## (undated) DEVICE — LINER SUCTION 1500CC